# Patient Record
Sex: MALE | Race: WHITE | NOT HISPANIC OR LATINO | Employment: FULL TIME | URBAN - METROPOLITAN AREA
[De-identification: names, ages, dates, MRNs, and addresses within clinical notes are randomized per-mention and may not be internally consistent; named-entity substitution may affect disease eponyms.]

---
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
## 2019-06-04 NOTE — PROGRESS NOTES
"Patient seen in consultation for small subQ mass of the right face by Julio Azevedo    HPI:  Patient is a 17 year old male with lifelong history of subQ lump near his right eyebrow. Patient and mother state that it was more pronounced when he was a child. He says it had never been that painful and it has never been infected or drained. It does cause the skin near his eye to push into his field of vision slightly.    Review Of Systems    Skin: as above  Ears/Nose/Throat: negative  Respiratory: No shortness of breath, dyspnea on exertion, cough, or hemoptysis  Cardiovascular: negative  Gastrointestinal: negative  Genitourinary: negative  Musculoskeletal: negative  Neurologic: negative  Hematologic/Lymphatic/Immunologic: negative  Endocrine: negative      PMHx: denies    PSHx: none    Family History   Problem Relation Age of Onset     DIABETES No family hx of      Coronary Artery Disease No family hx of      Hypertension No family hx of      Hyperlipidemia No family hx of      CEREBROVASCULAR DISEASE No family hx of      Breast Cancer No family hx of      Colon Cancer No family hx of      Prostate Cancer No family hx of      Other Cancer No family hx of        Social History     Social History     Marital status: Single     Spouse name: N/A     Number of children: N/A     Years of education: N/A     Occupational History     Not on file.     Social History Main Topics     Smoking status: Never Smoker     Smokeless tobacco: Never Used     Alcohol use No     Drug use: No     Sexual activity: Not Currently     Other Topics Concern     Not on file     Social History Narrative       No current outpatient prescriptions on file.       Medications and history reviewed    Physical exam:  Vitals: BP 92/62  Temp 97.3  F (36.3  C) (Temporal)  Ht 1.778 m (5' 10\")  Wt 86.2 kg (190 lb)  BMI 27.26 kg/m2  BMI= Body mass index is 27.26 kg/(m^2).    Constitutional: Healthy, alert, non-distressed  Head: Normo-cephalic, atraumatic, " [FreeTextEntry1] : BP OK.]Probably has a heel spur. Pain in thumb probably arthritis or tensinitis, no lesions, r side superior and lateral to eyebrow with soft, mobile 1.3 cm subQ mass. no tenderness  Cardiovascular: RRR, no new murmurs, +S1, +S2 heart sounds, no clicks, rubs or gallops   Respiratory: CTAB, no rales, rhonchi or wheezing, equal chest rise, good respiratory effort   Gastrointestinal: Soft, non-tender, non distended, no rebound rigidity or guarding, no masses or hernias palpated   : Deferred  Musculoskeletal: Moves all extremities, normal  strength, no deformities noted   Skin: No suspicious lesions or rashes   Psychiatric: Mentation appears normal, affect appropriate   Hematologic/Lymphatic/Immunologic: Normal cervical and supraclavicular lymph nodes   Patient able to get up on table without difficulty.    Labs show:  No results found for this or any previous visit (from the past 24 hour(s)).      Assessment:     ICD-10-CM    1. Swelling, mass, or lump on face R22.0      Plan: This is a small lesion that is amenable to in office removal. The patient thinks he would be ok with the local anesthesia only. I described the procedure and potential complications. He is going to return later this week for removal.    Robbie Goyal, DO

## 2019-07-23 ENCOUNTER — OFFICE VISIT (OUTPATIENT)
Dept: FAMILY MEDICINE | Facility: CLINIC | Age: 19
End: 2019-07-23
Payer: COMMERCIAL

## 2019-07-23 VITALS
OXYGEN SATURATION: 98 % | BODY MASS INDEX: 27.38 KG/M2 | RESPIRATION RATE: 16 BRPM | HEART RATE: 116 BPM | TEMPERATURE: 97.6 F | SYSTOLIC BLOOD PRESSURE: 112 MMHG | DIASTOLIC BLOOD PRESSURE: 78 MMHG | WEIGHT: 190.8 LBS

## 2019-07-23 DIAGNOSIS — J02.9 PHARYNGITIS, UNSPECIFIED ETIOLOGY: Primary | ICD-10-CM

## 2019-07-23 LAB
DEPRECATED S PYO AG THROAT QL EIA: NORMAL
SPECIMEN SOURCE: NORMAL

## 2019-07-23 PROCEDURE — 99213 OFFICE O/P EST LOW 20 MIN: CPT | Performed by: NURSE PRACTITIONER

## 2019-07-23 PROCEDURE — 87081 CULTURE SCREEN ONLY: CPT | Performed by: NURSE PRACTITIONER

## 2019-07-23 PROCEDURE — 87880 STREP A ASSAY W/OPTIC: CPT | Performed by: NURSE PRACTITIONER

## 2019-07-23 ASSESSMENT — PAIN SCALES - GENERAL: PAINLEVEL: EXTREME PAIN (8)

## 2019-07-23 NOTE — PATIENT INSTRUCTIONS
Patient Education     Pharyngitis (Sore Throat), Report Pending    Pharyngitis (sore throat) is often due to a virus. It can also be caused by streptococcus (strep), bacteria. This is often called strep throat. Both viral and strep infections can cause throat pain that is worse when swallowing, aching all over, headache, and fever. Both types of infections are contagious. They may be spread by coughing, kissing, or touching others after touching your mouth or nose.  A test has been done to find out if you or your child have strep throat. Call this facility or your healthcare provider if you were not given your test results. If the test is positive for strep infection, you will need to take antibiotic medicines. A prescription can be called into your pharmacy at that time. If the test is negative, you probably have a viral pharyngitis. This does not need to be treated with antibiotics. Until you receive the results of the strep test, you should stay home from work. If your child is being tested, he or she should stay home from school.  Home care    Rest at home. Drink plenty of fluids so you won't get dehydrated.    If the test is positive for strep, you or your child should not go to work or school for the first 2 days of taking the antibiotics. After this time, you or your child will not be contagious. You or your child can then return to work or school when feeling better.     Use the antibiotic medicine for the full 10 days. Do not stop the medicine even if you or your child feel better. This is very important to make sure the infection is fully treated. It is also important to prevent medicine-resistant germs from growing. If you or your child were given an antibiotic shot, no more antibiotics are needed.    Use throat lozenges or numbing throat sprays to help reduce pain. Gargling with warm salt water will also help reduce throat pain. Dissolve 1/2 teaspoon of salt in 1 glass of warm water. Children can sip  on juice or a popsicle. Children 5 years and older can also suck on a lollipop or hard candy.    Don't eat salty or spicy foods or give them to your child. These can irritate the throat.  Other medicine for a child: You can give your child acetaminophen for fever, fussiness, or discomfort. In babies over 6 months of age, you may use ibuprofen instead of acetaminophen. If your child has chronic liver or kidney disease or ever had a stomach ulcer or GI bleeding, talk with your child s healthcare provider before giving these medicines. Aspirin should never be used by any child under 18 years of age who has a fever. It may cause severe liver damage.  Other medicine for an adult: You may use acetaminophen or ibuprofen to control pain or fever, unless another medicine was prescribed for this. If you have chronic liver or kidney disease or ever had a stomach ulcer or GI bleeding, talk with your healthcare provider before using these medicines.  Follow-up care  Follow up with your healthcare provider or our staff if you or your child don't get better over the next week.  When to seek medical advice  Call your healthcare provider right away if any of these occur:    Fever as directed by your healthcare provider. For children, seek care if:  ? Your child is of any age and has repeated fevers above 104 F (40 C).  ? Your child is younger than 2 years of age and has a fever of 100.4 F (38 C) for more than 1 day.  ? Your child is 2 years old or older and has a fever of 100.4 F (38 C) for more than 3 days.    New or worsening ear pain, sinus pain, or headache    Painful lumps in the back of neck    Stiff neck    Lymph nodes are getting larger     Can t swallow liquids, a lot of drooling, or can t open mouth wide due to throat pain    Signs of dehydration, such as very dark urine or no urine, sunken eyes, dizziness    Trouble breathing or noisy breathing    Muffled voice    New rash    Other symptoms getting worse  Prevention  Here  are steps you can take to help prevent an infection:    Keep good hand washing habits.    Don t have close contact with people who have sore throats, colds, or other upper respiratory infections.    Don t smoke, and stay away from secondhand smoke.    Stay up to date with of your vaccines.  Date Last Reviewed: 11/1/2017 2000-2018 [employer]. [address] [place], [place] [address]. All rights reserved. This information is not intended as a substitute for professional medical care. Always follow your healthcare professional's instructions.

## 2019-07-23 NOTE — LETTER
July 25, 2019      Cruzito Rodas  [id number] [address] [address]        Dear ,    We are writing to inform you of your test results.    Please let the patient know his throat culture is negative     Resulted Orders   Strep, Rapid Screen   Result Value Ref Range    Specimen Description Throat     Rapid Strep A Screen       NEGATIVE: No Group A streptococcal antigen detected by immunoassay, await culture report.   Beta strep group A culture   Result Value Ref Range    Specimen Description Throat     Culture Micro No beta hemolytic Streptococcus Group A isolated        If you have any questions or concerns, please call the clinic at the number listed above.       Sincerely,        SID Vela CNP

## 2019-07-23 NOTE — PROGRESS NOTES
Anna GOMEZ Case is a 18 year old male who presents to clinic today for the following health issues:    HPI   Acute Illness   Acute illness concerns: sore throat  Onset: 24 hours    Fever: no    Chills/Sweats: YES    Headache (location?): Yes    Sinus Pressure:YES    Conjunctivitis:  YES-     Ear Pain: no    Rhinorrhea: YES    Congestion: YES    Sore Throat: YES     Cough: no    Wheeze: no    Decreased Appetite: no    Nausea: no    Vomiting: no    Diarrhea:  no    Dysuria/Freq.: no    Fatigue/Achiness: YES    Sick/Strep Exposure: works at [employer] - he does not know if he could have been exposed     Therapies Tried and outcome: none        BP Readings from Last 3 Encounters:   07/23/19 112/78   05/11/18 120/82   04/17/18 119/72    Wt Readings from Last 3 Encounters:   07/23/19 86.5 kg (190 lb 12.8 oz) (88 %)*   05/11/18 86.2 kg (190 lb) (90 %)*   04/11/18 86.2 kg (190 lb) (90 %)*     * Growth percentiles are based on CDC (Boys, 2-20 Years) data.                      Reviewed and updated as needed this visit by Provider         Review of Systems   ROS COMP: Constitutional, HEENT, cardiovascular, pulmonary, gi and gu systems are negative, except as otherwise noted.      Objective    /78 (BP Location: Right arm, Patient Position: Sitting, Cuff Size: Adult Regular)   Pulse 116   Temp 97.6  F (36.4  C) (Temporal)   Resp 16   Wt 86.5 kg (190 lb 12.8 oz)   SpO2 98%   BMI 27.38 kg/m    Body mass index is 27.38 kg/m .  Physical Exam   GENERAL: healthy, alert and no distress  EYES: Eyes grossly normal to inspection, PERRL and conjunctivae and sclerae normal  HENT: Ear canals are clear, TMs pearly gray.  No maxillary sinus tenderness to palpation.  Posterior oropharynx is erythematous without exudate or tonsillar hypertrophy  NECK: no adenopathy, no asymmetry, masses, or scars and thyroid normal to palpation  RESP: lungs clear to auscultation - no rales, rhonchi or wheezes  CV: regular rate and  rhythm, normal S1 S2, no S3 or S4, no murmur, click or rub, no peripheral edema and peripheral pulses strong  ABDOMEN: soft, nontender, no hepatosplenomegaly, no masses and bowel sounds normal      Diagnostic Test Results:  Labs reviewed in Epic  Results for orders placed or performed in visit on 07/23/19 (from the past 24 hour(s))   Strep, Rapid Screen   Result Value Ref Range    Specimen Description Throat     Rapid Strep A Screen       NEGATIVE: No Group A streptococcal antigen detected by immunoassay, await culture report.           Assessment & Plan       ICD-10-CM    1. Pharyngitis, unspecified etiology J02.9 Strep, Rapid Screen     Beta strep group A culture        Symptomatic and supportive measures including saline gargles, Tylenol or ibuprofen as needed.  Increase oral fluids.  Diet as tolerated.  Throat culture will be submitted and patient will be notified of result        SID Vela [facility]

## 2019-07-25 LAB
BACTERIA SPEC CULT: NORMAL
SPECIMEN SOURCE: NORMAL

## 2020-11-06 ENCOUNTER — VIRTUAL VISIT (OUTPATIENT)
Dept: FAMILY MEDICINE | Facility: OTHER | Age: 20
End: 2020-11-06

## 2020-11-06 NOTE — PROGRESS NOTES
"Date: 2020 11:53:23  Clinician: Lise Goldstein  Clinician NPI: [id number]  Patient: Cruzito Case  Patient : 2000  Patient Address: [address] [place] [address]  Patient Phone: [phone]  Visit Protocol: URI  Patient Summary:  Cruzito is a 20 year old ( : 2000 ) male who initiated a [facility] Visit for COVID-19 (Coronavirus) evaluation and screening. When asked the question \"Please sign me up to receive news, health information and promotions. \", Cruzito responded \"No\".    When asked when his symptoms started, Cruzito reported that he does not have any symptoms.   He denies taking antibiotic medication in the past month and having recent facial or sinus surgery in the past 60 days.    Pertinent COVID-19 (Coronavirus) information  Cruzito does not work or volunteer as healthcare worker or a . In the past 14 days, Cruzito has not worked or volunteered at a healthcare facility or group living setting.   In the past 14 days, he also has not lived in a congregate living setting.   Cruzito has not had a close contact with a laboratory-confirmed COVID-19 patient within the last 14 days.    Since 2019, Cruzito has not been tested for COVID-19 and has not had upper respiratory infection or influenza-like illness.   Pertinent medical history  Cruzito needs a return to work/school note.   Weight: 190 lbs   Cruzito does not smoke or use smokeless tobacco.   Weight: 190 lbs  A synchronous phone visit was initiated by the provider for the following reason: not sure how to help    MEDICATIONS: No current medications, ALLERGIES: NKDA  Clinician Response:  Dear Cruzito,   Based on your exposure to COVID-19 (coronavirus), we would like to test you for this virus.  1. Please call [phone] to schedule your visit. Explain that you were referred by [facility] to have a COVID-19 test. Be ready to share your [facility] visit ID number.  * If you need to schedule in " [place] please call [phone] or for [employer] employees please call [phone].   * If you need to schedule in the [place] area please call [phone]. [place] employees call [phone].   The following will serve as your written order for this COVID Test, ordered by me, for the indication of suspected COVID [Z20.828]: The test will be ordered in [employer], our electronic health record, after you are scheduled. It will show as ordered and authorized by Tim Thakkar MD.  Order: COVID-19 (coronavirus) PCR for ASYMPTOMATIC EXPOSURE [facility].   If you know you have had close contact with someone who tested positive, you should be quarantined for 14 days after this exposure. You should stay in quarantine for the14 days even if the covid test is negative, the optimal time to test after exposure is 5-7 days from the exposure  Quarantine means   What should I do?  For safety, it's very important to follow these rules. Do this for 14 days after the date you were last exposed to the virus..  Stay home and away from others. Don't go to school or anywhere else. Generally quarantine means staying home from work but there are some exceptions to this. Please contact your workplace.   No hugging, kissing or shaking hands.  Don't let anyone visit.  Cover your mouth and nose with a mask, tissue or washcloth to avoid spreading germs.  Wash your hands and face often. Use soap and water.  What are the symptoms of COVID-19?  The most common symptoms are cough, fever and trouble breathing. Less common symptoms include headache, body aches, fatigue (feeling very tired), chills, sore throat, stuffy or runny nose, diarrhea (loose poop), loss of taste or smell, belly pain, and nausea or vomiting (feeling sick to your stomach or throwing up).  After 14 days, if you have still don't have symptoms, you likely don't have this virus.  If you develop symptoms, follow these guidelines.  If you're normally healthy: Please start  another [facility] visit to report your symptoms. Go to [email or web address].  If you have a serious health problem (like cancer, heart failure, an organ transplant or kidney disease): Call your specialty clinic. Let them know that you might have COVID-19.  2. When it's time for your COVID test:  Stay at least 6 feet away from others. (If someone will drive you to your test, stay in the backseat, as far away from the  as you can.)  Cover your mouth and nose with a mask, tissue or washcloth.  Go straight to the testing site. Don't make any stops on the way there or back.  Please note  Caregivers in these groups are at risk for severe illness due to COVID-19:  o People 65 years and older  o People who live in a nursing home or long-term care facility  o People with chronic disease (lung, heart, cancer, diabetes, kidney, liver, immunologic)  o People who have a weakened immune system, including those who:  Are in cancer treatment  Take medicine that weakens the immune system, such as corticosteroids  Had a bone marrow or organ transplant  Have an immune deficiency  Have poorly controlled HIV or AIDS  Are obese (body mass index of 40 or higher)  Smoke regularly  Where can I get more information?  [facility] -- About COVID-19: [email or web address]  CDC -- What to Do If You're Sick: www.cdc.gov/coronavirus/2019-ncov/about/steps-when-sick.html  CDC -- Ending Home Isolation: www.cdc.gov/coronavirus/2019-ncov/hcp/disposition-in-home-patients.html  CDC -- Caring for Someone: www.cdc.gov/coronavirus/2019-ncov/if-you-are-sick/care-for-someone.html  [facility] -- Interim Guidance for Hospital Discharge to Home: www.health.[facility].mn.us/diseases/coronavirus/hcp/hospdischarge.pdf  [facility] clinical trials (COVID-19 research studies): clinicalaffairs.[facility]/umn-clinical-trials  Below are the COVID-19 hotlines at the [place] Department of Health ([facility]). Interpreters are available.  For health questions: Call  648.698.4225 or 1-595.112.7761 (7 a.m. to 7 p.m.)  For questions about schools and childcare: Call 166-870-5897 or 1-695.254.8253 (7 a.m. to 7 p.m.)    Diagnosis: Contact with and (suspected) exposure to other viral communicable diseases  Diagnosis ICD: Z20.828  Triage Notes: I reviewed the patient's history, verified their identity, and explained the [facility] Visit process.    mom is positive for covid 19   member of the same household close contact  Synchronous Triage: phone, status: completed, duration: 301 seconds

## 2020-11-09 DIAGNOSIS — Z20.822 EXPOSURE TO COVID-19 VIRUS: Primary | ICD-10-CM

## 2020-11-09 PROCEDURE — U0003 INFECTIOUS AGENT DETECTION BY NUCLEIC ACID (DNA OR RNA); SEVERE ACUTE RESPIRATORY SYNDROME CORONAVIRUS 2 (SARS-COV-2) (CORONAVIRUS DISEASE [COVID-19]), AMPLIFIED PROBE TECHNIQUE, MAKING USE OF HIGH THROUGHPUT TECHNOLOGIES AS DESCRIBED BY CMS-2020-01-R: HCPCS | Performed by: FAMILY MEDICINE

## 2020-11-10 LAB
SARS-COV-2 RNA SPEC QL NAA+PROBE: NOT DETECTED
SPECIMEN SOURCE: NORMAL

## 2020-11-24 ENCOUNTER — OFFICE VISIT (OUTPATIENT)
Dept: FAMILY MEDICINE | Facility: CLINIC | Age: 20
End: 2020-11-24
Payer: COMMERCIAL

## 2020-11-24 VITALS
DIASTOLIC BLOOD PRESSURE: 74 MMHG | HEART RATE: 72 BPM | SYSTOLIC BLOOD PRESSURE: 118 MMHG | BODY MASS INDEX: 28.97 KG/M2 | TEMPERATURE: 97.8 F | RESPIRATION RATE: 16 BRPM | WEIGHT: 195.6 LBS | OXYGEN SATURATION: 96 % | HEIGHT: 69 IN

## 2020-11-24 DIAGNOSIS — D17.30 LIPOMA OF SKIN AND SUBCUTANEOUS TISSUE: Primary | ICD-10-CM

## 2020-11-24 PROCEDURE — 99213 OFFICE O/P EST LOW 20 MIN: CPT | Performed by: PHYSICIAN ASSISTANT

## 2020-11-24 ASSESSMENT — PAIN SCALES - GENERAL: PAINLEVEL: NO PAIN (0)

## 2020-11-24 ASSESSMENT — MIFFLIN-ST. JEOR: SCORE: 1886.58

## 2020-11-24 ASSESSMENT — PATIENT HEALTH QUESTIONNAIRE - PHQ9: SUM OF ALL RESPONSES TO PHQ QUESTIONS 1-9: 3

## 2020-11-24 NOTE — PATIENT INSTRUCTIONS
Patient Education     Understanding a Lipoma     A lipoma is a lump under the skin that s made of fat. It s not cancer (benign). It feels soft like rubber when you press it, and in most cases it doesn t hurt. Some people have more than one. A lipoma grows slowly over time and doesn t cause many problems. Lipomas occur most often in adults from ages 40 to 60. They are more common in men.   How to say it  [id number]  What causes a lipoma?  Experts don't know yet what causes lipomas. They are still learning more. They may be partly caused by a problem in a gene. They can run in families. Familial multiple lipomatosis is when 2 or more family members have many lipomas.  Symptoms of a lipoma  The main symptom of a lipoma is a soft lump under the skin that doesn t hurt unless it is pressing on a nerve. It may be small, around 1/4 inch across. Or it may be larger, up to 4 inches across or more.  There are different kinds of lipomas. The most common kind occurs under the skin of the shoulders, chest, back, belly, or under the arms. In some cases, a lipoma can occur on the legs. In rare cases, one may occur deeper in the body or in a muscle.  Treatment for a lipoma  In most cases, a lipoma doesn t need treatment. Your healthcare provider may look at it during regular checkups to see if it changes.  But if the lipoma is painful or you want it removed for cosmetic reasons, it can be removed with surgery. The surgery is called excision. The lipoma will most likely not grow back after surgery. During surgery, the area around the lipoma is numbed. If you have a deep lipoma, you may need medicine to numb a larger area (regional anesthesia). Or you may need medicine to put you to sleep during the procedure (general anesthesia). Then the doctor makes a cut over the area of the lipoma. He or she removes the lump of fat. The cut is then closed with stitches.  Possible complications of a lipoma  A large lipoma inside the body can  press on organs, nerves, or other tissues and cause problems. For example, it can cause problems with breathing or digestion.  Living with a lipoma  Your healthcare provider may look at the lipoma during regular checkups to see if it changes or is causing problems.  When to call your healthcare provider  Call your healthcare provider right away if you have any of these:    Lipoma that grows quickly, causes pain, or feels hard    New lipomas  [employer] last reviewed this educational content on 11/1/2018 2000-2020 [employer]. [address] [place] [address]. All rights reserved. This information is not intended as a substitute for professional medical care. Always follow your healthcare professional's instructions.           Patient Education     Lipoma (Removed)   A lipoma is a growth made of fatty tissue. It is not cancer (benign). It looks like a soft lump, often less than 2 inches across. A lipoma may be removed (excised) because you don t like how it looks. Or it may be removed if it is painful or growing. A lipoma is made of fat cells. But it is not linked to diet. And it does not mean that you are overweight.  Home care  The following guidelines will help you care for your wound:    Keep the wound clean and dry. If a bandage was applied and it gets wet or dirty, replace it. Otherwise, leave it in place for the first 24 hours. Then change it once a day or as directed.    If stitches (sutures) were used, clean the wound daily:  ? After removing the bandage, wash the area with soap and water. Use a cotton swab to loosen and remove any blood or crust that forms.  ? After cleaning, apply a thin layer of petroleum ointment. Or your healthcare provider may advise an antibiotic ointment. This will keep the wound clean and make it easier to remove the stitches. Reapply the bandage.  ? You may shower as normal after the first 24 hours. But don t soak the area in water (no baths or swimming)  until the stitches are removed.    If surgical tape closures were used, keep the area clean and dry. If the area gets wet, pat it dry with a towel. After the surgical tape closures have been removed, it's safe to go back to your normal activities.    You may use over-the-counter pain medicine to control pain, unless another medicine was given. Talk with your provider before using these medicines if you have long-term (chronic) liver or kidney disease, or ever had a stomach ulcer or GI (gastrointestinal) bleeding.  Follow-up care  Most skin wounds heal in 10 days. But an infection may sometimes occur, even with correct treatment. Check the wound daily for the signs of infection listed below. Stitches should be taken out in 7 to 14 days. You may have surgical tape closures. If these have not fallen off after 7 days, you can remove them yourself unless you were told otherwise.  When to get medical advice  Call your healthcare provider right away if any of these occur:    Pain in the wound gets worse    Redness, swelling, or pus coming from the wound    Fever of 100.4 F (38 C) or higher, or as directed by your provider    Stitches come apart or fall out, or surgical tape falls off before 5 days    The wound edges reopen    Numb feeling that doesn t go away by the time stitches are removed  [employer] last reviewed this educational content on 8/1/2019 2000-2020 [employer]. [address] [place] [address], [place] [address]. All rights reserved. This information is not intended as a substitute for professional medical care. Always follow your healthcare professional's instructions.

## 2020-11-24 NOTE — NURSING NOTE
Health Maintenance Due   Topic Date Due     HPV IMMUNIZATION (1 - Male 2-dose series) 07/29/2010     HIV SCREENING  07/29/2014     HEPATITIS C SCREENING  07/29/2017     PHQ-9  10/04/2018     PREVENTIVE CARE VISIT  04/04/2019     INFLUENZA VACCINE (1) 09/01/2020     Sindi GOMEZ LPN

## 2020-11-24 NOTE — PROGRESS NOTES
"Anna [place] D Case is a 20 year old male who presents to clinic today for the following health issues:    HPI         Concern - lump on his forehead  Onset: removed  2 years ago and came back  Description: lump  Intensity: mild  Progression of Symptoms:  same  Accompanying Signs & Symptoms: one  Previous history of similar problem: YES  Precipitating factors:        Worsened by: nothing  Alleviating factors:        Improved by: nothing  Therapies tried and outcome:  none     Patient is a 20 year old male who presents today to discuss recurrent lipoma. He says that he has had a lump along his right eyebrow since early childhood. In 2018 it was removed by general surgery and sent to pathology. 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 Patient said that it slowly began to regrow over the year following the extraction. He estimates that it has now been present for at least 1 year. He is interested in returning to general surgery to have it removed once again. We did discuss plastic surgeons vs general surgery. He is comfortable returning to the same surgeon who originally had it removed.     Review of Systems   Constitutional, HEENT, cardiovascular, pulmonary, gi and gu systems are negative, except as otherwise noted.      Objective    /74 (BP Location: Right arm, Patient Position: Chair, Cuff Size: Adult Regular)   Pulse 72   Temp 97.8  F (36.6  C) (Temporal)   Resp 16   Ht 1.759 m (5' 9.25\")   Wt 88.7 kg (195 lb 9.6 oz)   SpO2 96%   BMI 28.68 kg/m    Body mass index is 28.68 kg/m .  Physical Exam   GENERAL: healthy, alert and no distress  RESP: lungs clear to auscultation - no rales, rhonchi or wheezes  CV: regular rate and rhythm, normal S1 S2, no S3 or S4, no murmur, click or rub, no peripheral edema and peripheral pulses strong  MS: no gross musculoskeletal defects noted, no edema  SKIN: [id number], rubbery, mobile mass superolateral to the right eyebrow  PSYCH: mentation appears normal, affect " normal/bright       Assessment & Plan     Lipoma of skin and subcutaneous tissue  Referral placed, patient will call to schedule. If he changes his mind and wishes to be seen by plastic surgeon he will call. Educational information sent home with the patient.   - GENERAL SURG ADULT REFERRAL; Future      Return in about 6 months (around 5/24/2021) for Return for scheduled annual checkup with PCP.    JOSE ALBERTO Rueda [facility]

## 2020-12-04 ENCOUNTER — OFFICE VISIT (OUTPATIENT)
Dept: SURGERY | Facility: CLINIC | Age: 20
End: 2020-12-04
Attending: PHYSICIAN ASSISTANT
Payer: COMMERCIAL

## 2020-12-04 VITALS
HEIGHT: 69 IN | DIASTOLIC BLOOD PRESSURE: 60 MMHG | SYSTOLIC BLOOD PRESSURE: 92 MMHG | TEMPERATURE: 98.8 F | BODY MASS INDEX: 28.58 KG/M2 | WEIGHT: 193 LBS

## 2020-12-04 DIAGNOSIS — D17.30 LIPOMA OF SKIN AND SUBCUTANEOUS TISSUE: ICD-10-CM

## 2020-12-04 PROCEDURE — 99213 OFFICE O/P EST LOW 20 MIN: CPT | Performed by: SURGERY

## 2020-12-04 ASSESSMENT — MIFFLIN-ST. JEOR: SCORE: 1874.78

## 2020-12-04 NOTE — LETTER
"    12/4/2020         RE: Cruzito GOMEZ Case  [id number] [facility] [address]        Dear Colleague,    Thank you for referring your patient, Cruzito GOMEZ Case, to the [facility]. Please see a copy of my visit note below.    Patient seen in consultation for right eyebrow subQ lump    HPI:  Patient is a 20 year old male who I saw in 2018 for same. We did in office excision at that time and specimen was sent for pathology. 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 It seems to have recurred in the same location. He is unsure whether it was \"flat\" for some period of time or when it started to bump again. He is interested in having it removed again.    Review Of Systems    Skin: as above  Ears/Nose/Throat: negative  Respiratory: No shortness of breath, dyspnea on exertion, cough, or hemoptysis  Cardiovascular: negative  Gastrointestinal: negative  Genitourinary: negative  Musculoskeletal: negative  Neurologic: negative  Hematologic/Lymphatic/Immunologic: negative  Endocrine: negative      No past medical history on file.    No past surgical history on file.    Family History   Problem Relation Age of Onset     Diabetes No family hx of      Coronary Artery Disease No family hx of      Hypertension No family hx of      Hyperlipidemia No family hx of      Cerebrovascular Disease No family hx of      Breast Cancer No family hx of      Colon Cancer No family hx of      Prostate Cancer No family hx of      Other Cancer No family hx of        Social History     Socioeconomic History     Marital status: Single     Spouse name: Not on file     Number of children: Not on file     Years of education: Not on file     Highest education level: Not on file   Occupational History     Not on file   Social Needs     Financial resource strain: Not on file     Food insecurity     Worry: Not on file     Inability: Not on file     Transportation needs     Medical: Not on file     Non-medical: Not on file   Tobacco Use     " "Smoking status: Never Smoker     Smokeless tobacco: Never Used   Substance and Sexual Activity     Alcohol use: No     Drug use: No     Sexual activity: Not Currently   Lifestyle     Physical activity     Days per week: Not on file     Minutes per session: Not on file     Stress: Not on file   Relationships     Social connections     Talks on phone: Not on file     Gets together: Not on file     Attends [employer] service: Not on file     Active member of club or organization: Not on file     Attends meetings of clubs or organizations: Not on file     Relationship status: Not on file     Intimate partner violence     Fear of current or ex partner: Not on file     Emotionally abused: Not on file     Physically abused: Not on file     Forced sexual activity: Not on file   Other Topics Concern     Not on file   Social History Narrative     Not on file       No current outpatient medications on file.       Medications and history reviewed    Physical exam:  Vitals: BP 92/60   Temp 98.8  F (37.1  C) (Temporal)   Ht 1.759 m (5' 9.25\")   Wt 87.5 kg (193 lb)   BMI 28.30 kg/m    BMI= Body mass index is 28.3 kg/m .    Constitutional: Healthy, alert, non-distressed   Head: Normo-cephalic, atraumatic, no lesions, right eyebrow soft tissue lump, mobile, soft, ~1.5 cm just deep to previous well healed incision  Cardiovascular: RRR, no new murmurs, +S1, +S2 heart sounds, no clicks, rubs or gallops   Respiratory: CTAB, no rales, rhonchi or wheezing, equal chest rise, good respiratory effort   Gastrointestinal: Soft, non-tender, non distended, no rebound rigidity or guarding, no masses or hernias palpated   : Deferred  Musculoskeletal: Moves all extremities, normal  strength, no deformities noted   Skin: No suspicious lesions or rashes   Psychiatric: Mentation appears normal, affect appropriate   Hematologic/Lymphatic/Immunologic: Normal cervical and supraclavicular lymph nodes   Patient able to get up on table without " difficulty.    Labs show:  No results found for this or any previous visit (from the past 24 hour(s)).    Imaging shows:  none    Assessment:     ICD-10-CM    1. 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 He is going to return in 7 days for the procedure as we do not have time this morning.    Robbie Goyal, DO        Again, thank you for allowing me to participate in the care of your patient.        Sincerely,        Robbie Goyal DO

## 2020-12-04 NOTE — PROGRESS NOTES
"Patient seen in consultation for right eyebrow subQ lump    HPI:  Patient is a 20 year old male who I saw in 2018 for same. We did in office excision at that time and specimen was sent for pathology. 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 It seems to have recurred in the same location. He is unsure whether it was \"flat\" for some period of time or when it started to bump again. He is interested in having it removed again.    Review Of Systems    Skin: as above  Ears/Nose/Throat: negative  Respiratory: No shortness of breath, dyspnea on exertion, cough, or hemoptysis  Cardiovascular: negative  Gastrointestinal: negative  Genitourinary: negative  Musculoskeletal: negative  Neurologic: negative  Hematologic/Lymphatic/Immunologic: negative  Endocrine: negative      No past medical history on file.    No past surgical history on file.    Family History   Problem Relation Age of Onset     Diabetes No family hx of      Coronary Artery Disease No family hx of      Hypertension No family hx of      Hyperlipidemia No family hx of      Cerebrovascular Disease No family hx of      Breast Cancer No family hx of      Colon Cancer No family hx of      Prostate Cancer No family hx of      Other Cancer No family hx of        Social History     Socioeconomic History     Marital status: Single     Spouse name: Not on file     Number of children: Not on file     Years of education: Not on file     Highest education level: Not on file   Occupational History     Not on file   Social Needs     Financial resource strain: Not on file     Food insecurity     Worry: Not on file     Inability: Not on file     Transportation needs     Medical: Not on file     Non-medical: Not on file   Tobacco Use     Smoking status: Never Smoker     Smokeless tobacco: Never Used   Substance and Sexual Activity     Alcohol use: No     Drug use: No     Sexual activity: Not Currently   Lifestyle     Physical activity     Days per week: Not on file     Minutes per session: " "Not on file     Stress: Not on file   Relationships     Social connections     Talks on phone: Not on file     Gets together: Not on file     Attends Church service: Not on file     Active member of club or organization: Not on file     Attends meetings of clubs or organizations: Not on file     Relationship status: Not on file     Intimate partner violence     Fear of current or ex partner: Not on file     Emotionally abused: Not on file     Physically abused: Not on file     Forced sexual activity: Not on file   Other Topics Concern     Not on file   Social History Narrative     Not on file       No current outpatient medications on file.       Medications and history reviewed    Physical exam:  Vitals: BP 92/60   Temp 98.8  F (37.1  C) (Temporal)   Ht 1.759 m (5' 9.25\")   Wt 87.5 kg (193 lb)   BMI 28.30 kg/m    BMI= Body mass index is 28.3 kg/m .    Constitutional: Healthy, alert, non-distressed   Head: Normo-cephalic, atraumatic, no lesions, right eyebrow soft tissue lump, mobile, soft, ~1.5 cm just deep to previous well healed incision  Cardiovascular: RRR, no new murmurs, +S1, +S2 heart sounds, no clicks, rubs or gallops   Respiratory: CTAB, no rales, rhonchi or wheezing, equal chest rise, good respiratory effort   Gastrointestinal: Soft, non-tender, non distended, no rebound rigidity or guarding, no masses or hernias palpated   : Deferred  Musculoskeletal: Moves all extremities, normal  strength, no deformities noted   Skin: No suspicious lesions or rashes   Psychiatric: Mentation appears normal, affect appropriate   Hematologic/Lymphatic/Immunologic: Normal cervical and supraclavicular lymph nodes   Patient able to get up on table without difficulty.    Labs show:  No results found for this or any previous visit (from the past 24 hour(s)).    Imaging shows:  none    Assessment:     ICD-10-CM    1. Lipoma of skin and subcutaneous tissue  D17.30 GENERAL SURG ADULT REFERRAL     Plan: Re-excision of " recurrent fibrolipoma of the right eyebrow in the office. He is going to return in 7 days for the procedure as we do not have time this morning.    Robbie Goyal, DO

## 2020-12-11 ENCOUNTER — OFFICE VISIT (OUTPATIENT)
Dept: SURGERY | Facility: CLINIC | Age: 20
End: 2020-12-11
Payer: COMMERCIAL

## 2020-12-11 DIAGNOSIS — R22.0 SUBCUTANEOUS MASS OF HEAD: Primary | ICD-10-CM

## 2020-12-11 PROCEDURE — 88304 TISSUE EXAM BY PATHOLOGIST: CPT | Mod: GC | Performed by: PATHOLOGY

## 2020-12-11 PROCEDURE — 11443 EXC FACE-MM B9+MARG 2.1-3 CM: CPT | Mod: 51 | Performed by: SURGERY

## 2020-12-11 PROCEDURE — 12052 INTMD RPR FACE/MM 2.6-5.0 CM: CPT | Performed by: SURGERY

## 2020-12-11 NOTE — LETTER
[address] [address]  Phone: [phone]    December 11, 2020        Cruzito GOMEZ Case  [id number] [facility] [address]          To whom it may concern:    RE: Cruzito GOMEZ Case    Patient was seen today for a procedure at our clinic and missed work. Please excuse the patient from work 12/11/20 & 12/12/20.           Please contact me for questions or concerns.      Sincerely,        Robbie Goyal, DO

## 2020-12-11 NOTE — PROGRESS NOTES
PROCEDURE: excision right forehead subcutaneous mass   Written consent was obtained    The right forehead area was prepped and appropriately anesthetized with 1% lidocaine with epinephrine. Using the usual technique, excision of recurrent fibrolipoma with underlying cyst was performed.  The cyst measured approximately 2.8 x 2.5 x 1.0 cm. Closure was accomplished with interrupted 3-0 vicryl for the dermal layer and running subcuticular 4-0 monocryl for the skin. Total length of the incision after closure was 3.2 cm. An appropriate  dressing was applied.  The procedure was well tolerated and without complications. Specimen was sent to Pathology.

## 2020-12-11 NOTE — LETTER
Cruzito GOMEZ Case  [address] [address] [place] [id number]    January 13, 2021      Dear Cruzito,  This letter is to inform you of the results of your pathology report from your excision.  Your pathology report was:  FINAL DIAGNOSIS:   Skin, right forehead, excision:   - Features consistent with partially ruptured cyst    COMMENT:   Given the presence of pilosebaceous structures and granulomatous   inflammation, these features are consistent   with a ruptured dermoid cyst.  This is consistent with the visual findings during the procedure. Hope you are healing well.  If you have further questions please don t hesitate to call our clinic at [phone].   Sincerely,     Robbie Goyal, DO

## 2020-12-11 NOTE — LETTER
12/11/2020         RE: Cruzito Rodas  [address] [address] [address]        Dear Colleague,    Thank you for referring your patient, Cruzito Rodas, to the [facility]. Please see a copy of my visit note below.    PROCEDURE: excision right forehead subcutaneous mass   Written consent was obtained    The right forehead area was prepped and appropriately anesthetized with 1% lidocaine with epinephrine. Using the usual technique, excision of recurrent fibrolipoma with underlying cyst was performed.  Closure was accomplished with interrupted 3-0 vicryl for the dermal layer and running subcuticular 4-0 monocryl for the skin. Total length of the incision after closure was 3.2 cm. An appropriate  dressing was applied.  The procedure was well tolerated and without complications. Specimen was sent to Pathology.          Again, thank you for allowing me to participate in the care of your patient.        Sincerely,        Robbie Goyal, DO

## 2020-12-18 LAB — COPATH REPORT: NORMAL

## 2021-01-09 ENCOUNTER — HEALTH MAINTENANCE LETTER (OUTPATIENT)
Age: 21
End: 2021-01-09

## 2021-05-20 ENCOUNTER — NURSE TRIAGE (OUTPATIENT)
Dept: FAMILY MEDICINE | Facility: CLINIC | Age: 21
End: 2021-05-20

## 2021-05-20 NOTE — TELEPHONE ENCOUNTER
Patient states his throat looks like it has in the past when he has had strep throat.  He is offered an appointment today in [place], but he prefers to be seen tomorrow in [place].  He is scheduled with Arabella Drummond.  He is given home care measures for sure throat.    Reason for Disposition    Patient requesting a strep throat test    Patient wants to be seen    Sore throat    Additional Information    Negative: Severe difficulty breathing (e.g., struggling for each breath, speaks in single words)    Negative: Sounds like a life-threatening emergency to the triager    Negative: Throat culture results, call about    Negative: Productive cough is the main symptom    Negative: Runny nose is the main symptom    Negative: Drooling or spitting out saliva (because can't swallow)    Negative: Unable to open mouth completely    Negative: Drinking very little and has signs of dehydration (e.g., no urine > 12 hours, very dry mouth, very lightheaded)    Negative: Patient sounds very sick or weak to the triager    Negative: Difficulty breathing (per caller) but not severe    Negative: Fever > 103 F (39.4 C)    Negative: Refuses to drink anything for > 12 hours    Negative: Fever present > 3 days (72 hours)    Negative: Pus on tonsils (back of throat) and swollen neck lymph nodes ('glands')    Negative: Earache also present    Negative: Widespread rash (especially chest and abdomen)    Negative: Diabetes mellitus or weak immune system (e.g., HIV positive, cancer chemo, splenectomy, organ transplant, chronic steroids)    Negative: History of rheumatic fever    Protocols used: SORE THROAT-A-OH

## 2021-05-20 NOTE — PROGRESS NOTES
Assessment & Plan     Cough  - Symptomatic COVID-19 Virus (Coronavirus) by PCR    Acute pharyngitis, unspecified etiology  - Streptococcus A Rapid Scr w Reflx to PCR    Covid and strep testing pending.  Isolation quarantine guidelines reviewed.  Symptoms are most likely viral with an allergic component.  Discussed symptomatic measures including fluids, rest, honey, salt water gargles, antihistamine.  Follow-up with PCP if symptoms worsen or fail to improve.    30 minutes spent on the date of the encounter doing chart review, patient visit and documentation       Return in about 1 week (around 5/28/2021) for Recheck with primary care provider if not improved.    [facility]SE ALBERTO [facility] is a 20 year old who presents for the following health issues     HPI     Acute Illness  Acute illness concerns: sore throat  Onset/Duration: 3 days  Symptoms:  Fever: no  Chills/Sweats: no  Headache (location?): YES  Sinus Pressure: YES  Conjunctivitis:  no  Ear Pain: no  Rhinorrhea: YES  Congestion: YES  Sore Throat: YES- 4/10  Cough: YES-productive of yellow sputum, productive of green sputum, waxing and waning over time  Wheeze: no  Decreased Appetite: no  Nausea: no  Vomiting: no  Diarrhea: no  Fatigue/Achiness: YES- tired yesterday  Sick/Strep Exposure: no  Therapies tried and outcome: cough drops, gargle salt water, warm shower, popsicles    Jean Claude presents to the clinic today for evaluation of a cough, sore throat and nasal congestion for the last 3 days.  He has had a headache, nasal congestion with rhinorrhea and postnasal drainage, sore throat, cough that feels like it is coming from a tickle in his throat and is often productive as well as increased fatigue.  He has not had a fever, shortness of breath or wheezing.  He states that last night symptoms began to improve and he still feels like symptoms are improving this morning.    Review of Systems   ROS negative  except as stated above.        Objective    /62   Pulse 72   Temp 98.1  F (36.7  C) (Temporal)   SpO2 98%   There is no height or weight on file to calculate BMI.  Physical Exam   GENERAL: healthy, alert and no distress  EYES: Eyes grossly normal to inspection, PERRL and conjunctivae and sclerae normal  HENT: ear canals and TM's normal, nose and mouth without ulcers or lesions  NECK: no adenopathy, no asymmetry, masses, or scars and thyroid normal to palpation  RESP: lungs clear to auscultation - no rales, rhonchi or wheezes  CV: regular rate and rhythm, normal S1 S2, no S3 or S4, no murmur, click or rub, no peripheral edema and peripheral pulses strong  SKIN: no suspicious lesions or rashes  NEURO: Normal strength and tone, mentation intact and speech normal    No results found for any visits on 05/21/21.

## 2021-05-21 ENCOUNTER — OFFICE VISIT (OUTPATIENT)
Dept: FAMILY MEDICINE | Facility: CLINIC | Age: 21
End: 2021-05-21
Payer: COMMERCIAL

## 2021-05-21 ENCOUNTER — TELEPHONE (OUTPATIENT)
Dept: FAMILY MEDICINE | Facility: CLINIC | Age: 21
End: 2021-05-21

## 2021-05-21 VITALS
HEART RATE: 72 BPM | DIASTOLIC BLOOD PRESSURE: 62 MMHG | TEMPERATURE: 98.1 F | OXYGEN SATURATION: 98 % | SYSTOLIC BLOOD PRESSURE: 107 MMHG

## 2021-05-21 DIAGNOSIS — R05.9 COUGH: Primary | ICD-10-CM

## 2021-05-21 DIAGNOSIS — J02.9 ACUTE PHARYNGITIS, UNSPECIFIED ETIOLOGY: ICD-10-CM

## 2021-05-21 LAB
DEPRECATED S PYO AG THROAT QL EIA: NEGATIVE
LABORATORY COMMENT REPORT: NORMAL
SARS-COV-2 RNA RESP QL NAA+PROBE: NEGATIVE
SARS-COV-2 RNA RESP QL NAA+PROBE: NORMAL
SPECIMEN SOURCE: NORMAL
STREP GROUP A PCR: NOT DETECTED

## 2021-05-21 PROCEDURE — U0003 INFECTIOUS AGENT DETECTION BY NUCLEIC ACID (DNA OR RNA); SEVERE ACUTE RESPIRATORY SYNDROME CORONAVIRUS 2 (SARS-COV-2) (CORONAVIRUS DISEASE [COVID-19]), AMPLIFIED PROBE TECHNIQUE, MAKING USE OF HIGH THROUGHPUT TECHNOLOGIES AS DESCRIBED BY CMS-2020-01-R: HCPCS | Performed by: PHYSICIAN ASSISTANT

## 2021-05-21 PROCEDURE — U0005 INFEC AGEN DETEC AMPLI PROBE: HCPCS | Performed by: PHYSICIAN ASSISTANT

## 2021-05-21 PROCEDURE — 87651 STREP A DNA AMP PROBE: CPT | Performed by: PHYSICIAN ASSISTANT

## 2021-05-21 PROCEDURE — 99N1174 PR STATISTIC STREP A RAPID: Performed by: PHYSICIAN ASSISTANT

## 2021-05-21 PROCEDURE — 99214 OFFICE O/P EST MOD 30 MIN: CPT | Performed by: PHYSICIAN ASSISTANT

## 2021-05-21 NOTE — TELEPHONE ENCOUNTER
Called and LM for patient to call back, please inform patient of negative rapid strep test, still awaiting covid results. Marine Phillips CMA ([facility])

## 2021-05-21 NOTE — TELEPHONE ENCOUNTER
Patient returned call. Lab result relayed assisted him with getting into mychart for future lab notes.

## 2021-05-21 NOTE — PATIENT INSTRUCTIONS
"Drink plenty of fluids and rest.  May use salt water gargles- about 8 oz warm water with about 1 teaspoon salt  Over the counter pain relievers such as Tylenol or ibuprofen may be used as needed.   Honey lemon tea helps to soothe the throat. \"Throat Coat\" tea is soothing as well.  Take an antihistamine such as Claritin (loratadine), Zyrtec (cetirizine) or Allegra (fexofenadine) daily for allergy symptoms.  Please follow up with primary care provider if not improving, worsening or new symptoms.      Quarantine is for those who have had a close contact to someone who is COVID positive. A close contact is defined as being within 6 feet for 15 minutes or longer. We recommend you stay home and away from others for 14 days to monitor for symptoms. If you develop symptoms, enter isolation guidelines and be tested for COVID-19. ** If you are living with someone who is COVID positive and sharing the same living space, you should quarantine beginning now but the 14 day timeline begins after that person's isolation ends.    Isolation is for those who have symptoms or test positive for COVID-19. You should remain home and away from others for 10 days after your symptoms start. You may return to activities after 10 days as long as you have been fever free for 24 hours and have had an improvement in your symptoms. If you are tested and your test comes back negative, you may return to activities 24 hours after you have been fever free without medication.      Your symptoms show that you may have coronavirus (COVID-19). This illness can cause fever, cough and trouble  breathing. Many people get a mild case and get better on their own. Some people can get very sick.    What should I do?  Starting now: Stay home and away from others (self-isolate) until:    You've had no fever--and no medicine that reduces fever--for 3 full days (72 hours). And     Your other symptoms have gotten better. For example, your cough or breathing has " improved. And     At least 10 days have passed since your symptoms started.    During this time, don t leave the house except for testing or medical care.    Stay in your own room, even for meals. Use your own bathroom if you can.    Stay away from others in your home. No hugging, kissing or shaking hands. No visitors.    Don t go to work, school or anywhere else.    Clean  high touch  surfaces often (doorknobs, counters, handles, etc.). Use a household cleaning  spray or wipes. You ll find a full list of  on the EPA website: www.epa.gov/pesticideregistration/  list-n-disinfectants-use-against-sars-cov-2.    Cover your mouth and nose with a mask, tissue or washcloth to avoid spreading germs.    Wash your hands and face often. Use soap and water.    Caregivers in these groups are at risk for severe illness due to COVID-19:  o People 65 years and older  o People who live in a nursing home or long-term care facility  o People with chronic disease (lung, heart, cancer, diabetes, kidney, liver, immunologic)  o People who have a weakened immune system, including those who:    Are in cancer treatment    Take medicine that weakens the immune system, such as corticosteroids    Had a bone marrow or organ transplant    Have an immune deficiency    Have poorly controlled HIV or AIDS    Are obese (body mass index of 40 or higher)    Smoke regularly  o Caregivers should wear gloves while washing dishes, handling laundry and cleaning  bedrooms and bathrooms.  o Use caution when washing and drying laundry: Don t shake dirty laundry, and use the  warmest water setting that you can.  o For more tips, go to www.cdc.gov/coronavirus/2019-ncov/downloads/10Things.pdf.    How can I take care of myself?  1. Get lots of rest. Drink extra fluids (unless a doctor has told you not to).  2. Take Tylenol (acetaminophen) for fever or pain. If you have liver or kidney problems, ask your family  doctor if it's okay to take Tylenol.  Adults  can take either:    650 mg (two 325 mg pills) every 4 to 6 hours, or     1,000 mg (two 500 mg pills) every 8 hours as needed.    Note: Don't take more than 3,000 mg in one day. Acetaminophen is found in many medicines  (both prescribed and over-the-counter medicines). Read all labels to be sure you don t take too  much.  For children, check the Tylenol bottle for the right dose. The dose is based on the child's age or weight.  3. If you have other health problems (like cancer, heart failure, an organ transplant or severe kidney  disease): Call your specialty clinic if you don't feel better in the next 2 days.  4. Know when to call 911. Emergency warning signs include:    Trouble breathing or shortness of breath    Pain or pressure in the chest that doesn t go away    Feeling confused like you haven t felt before, or not being able to wake up    Bluish-colored lips or face    Where can I get more information?    [facility] - About COVID-19: [email or web address]    CDC - What to Do If You re Sick: www.cdc.gov/coronavirus/2019-ncov/about/steps-when-sick.html    CDC - Ending Home Isolation: www.cdc.gov/coronavirus/2019-ncov/hcp/disposition-in-homepatients.  html    CDC - Caring for Someone: www.cdc.gov/coronavirus/2019-ncov/if-you-are-sick/care-for-someone.html    [facility] - Interim Guidance for Hospital Discharge to Home:  www.health.[facility].[place].us/diseases/coronavirus/hcp/hospdischarge.pdf    [facility] clinical trials (COVID-19 research studies): clinicalaffairs.[facility]-clinicaltrials    Below are the COVID-19 hotlines at the [facility] ([facility]). Interpreters are  available.  o For health questions: Call 522-745-7531 or 1-964.890.9955 (7 a.m. to 7 p.m.)  o For questions about schools and childcare: Call 451-471-4890 or 1-173.848.4201 (7 a.m. to 7 p.m.)

## 2021-08-27 ENCOUNTER — TELEPHONE (OUTPATIENT)
Dept: SURGERY | Facility: CLINIC | Age: 21
End: 2021-08-27

## 2021-08-27 NOTE — TELEPHONE ENCOUNTER
Patients mother called inquiring about an appointment for her son (C2C on file).  This would be for a possible pilonidal cyst.  Reviewed schedule, as patient has mondays off and the next Monday date is 9/13/21.  She is going to see if he can get work off earlier a different day of the week to have this addressed sooner.  Advised the s/s that would warrant seeking care in the mean time.     Medina VELÁSQUEZ, Lead RN, BSN. . .  8/27/2021, 3:45 PM

## 2021-09-13 ENCOUNTER — OFFICE VISIT (OUTPATIENT)
Dept: SURGERY | Facility: CLINIC | Age: 21
End: 2021-09-13
Payer: COMMERCIAL

## 2021-09-13 ENCOUNTER — TELEPHONE (OUTPATIENT)
Dept: SURGERY | Facility: CLINIC | Age: 21
End: 2021-09-13

## 2021-09-13 VITALS
SYSTOLIC BLOOD PRESSURE: 124 MMHG | TEMPERATURE: 98.6 F | BODY MASS INDEX: 26.68 KG/M2 | DIASTOLIC BLOOD PRESSURE: 60 MMHG | WEIGHT: 182 LBS

## 2021-09-13 DIAGNOSIS — L05.91 PILONIDAL CYST: Primary | ICD-10-CM

## 2021-09-13 PROCEDURE — 99213 OFFICE O/P EST LOW 20 MIN: CPT | Performed by: SPECIALIST

## 2021-09-13 NOTE — TELEPHONE ENCOUNTER
Spoke to patient, he needs to discuss with employer and will call another time to schedule surgery

## 2021-09-13 NOTE — LETTER
9/13/2021         RE: Cruzito GOMEZ Case  [address] [facility] [address]        Dear Colleague,    Thank you for referring your patient, Cruzito Rodas, to the [facility]. Please see a copy of my visit note below.    Consult requested by Dr. Azevedo    Reason consultation pilonidal cyst    HPI:  Patient is a 21-year-old white male who for the past several months has noticed a painful lump on his tailbone.  He states it comes and goes is made worse by sitting.  Denies any fevers chills or drainage.  States is way down and improved right now.  He was diagnosed with a pilonidal cyst for which she now was referred.  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    No past medical history on file.    No past surgical history on file.    No current outpatient medications on file.     No current facility-administered medications for this visit.        Allergies   Allergen Reactions     No Known Drug Allergies      Social History     Social History Narrative     Not on file     Family History   Problem Relation Age of Onset     Diabetes No family hx of      Coronary Artery Disease No family hx of      Hypertension No family hx of      Hyperlipidemia No family hx of      Cerebrovascular Disease No family hx of      Breast Cancer No family hx of      Colon Cancer No family hx of      Prostate Cancer No family hx of      Other Cancer No family hx of       ROS: 10 point ROS neg other than the symptoms noted above in the HPI.    PE:  B/P: 124/60, T: 98.6, P: Data Unavailable, R: Data Unavailable  General: well developed, well nourished WM who appears their stated age  HEENT: NC/AT, EOMI, (-)icterus, (-)injection  Neck: Supple, No JVD  Chest: CTA  Heart: S1, S2, (-)m/r/g  Abd: Soft, non tender, non distended, non tender, no masses  Back: Multiple crypts consistent with a pilonidal cyst.  No signs of infection.  Ext; Warm, no edema  Psych: AAOx3  Neuro: No focal  deficits    Impression/plan:  This is a 21-year-old gentleman with a the pilonidal cyst.  I discussed the etiology of pilonidal disease and the need for hair removal including clipping and Silvestre.  He expressed understanding.  I also discussed the role of excision and he wishes to proceed.  After discussion the patient the plan at this time is to proceed with an excision under general anesthesia.   The procedure, risks, benefits, and alternatives were discussed and the patient agrees to proceed.  In the meantime, he will clip and apply Silvestre to the area.  He knows to call me should it appear to be infected for some antibiotics.    Terence Nguyen MD, FACS            Again, thank you for allowing me to participate in the care of your patient.        Sincerely,        Terence Nguyen MD

## 2021-09-13 NOTE — PROGRESS NOTES
Consult requested by Dr. Azevedo    Reason consultation pilonidal cyst    HPI:  Patient is a 21-year-old white male who for the past several months has noticed a painful lump on his tailbone.  He states it comes and goes is made worse by sitting.  Denies any fevers chills or drainage.  States is way down and improved right now.  He was diagnosed with a pilonidal cyst for which she now was referred.  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    No past medical history on file.    No past surgical history on file.    No current outpatient medications on file.     No current facility-administered medications for this visit.        Allergies   Allergen Reactions     No Known Drug Allergies      Social History     Social History Narrative     Not on file     Family History   Problem Relation Age of Onset     Diabetes No family hx of      Coronary Artery Disease No family hx of      Hypertension No family hx of      Hyperlipidemia No family hx of      Cerebrovascular Disease No family hx of      Breast Cancer No family hx of      Colon Cancer No family hx of      Prostate Cancer No family hx of      Other Cancer No family hx of       ROS: 10 point ROS neg other than the symptoms noted above in the HPI.    PE:  B/P: 124/60, T: 98.6, P: Data Unavailable, R: Data Unavailable  General: well developed, well nourished WM who appears their stated age  HEENT: NC/AT, EOMI, (-)icterus, (-)injection  Neck: Supple, No JVD  Chest: CTA  Heart: S1, S2, (-)m/r/g  Abd: Soft, non tender, non distended, non tender, no masses  Back: Multiple crypts consistent with a pilonidal cyst.  No signs of infection.  Ext; Warm, no edema  Psych: AAOx3  Neuro: No focal deficits    Impression/plan:  This is a 21-year-old gentleman with a the pilonidal cyst.  I discussed the etiology of pilonidal disease and the need for hair removal including clipping and Silvestre.  He expressed understanding.  I also discussed the role of  excision and he wishes to proceed.  After discussion the patient the plan at this time is to proceed with an excision under general anesthesia.   The procedure, risks, benefits, and alternatives were discussed and the patient agrees to proceed.  In the meantime, he will clip and apply Silvestre to the area.  He knows to call me should it appear to be infected for some antibiotics.    Terence Nguyen MD, FACS

## 2021-09-16 DIAGNOSIS — Z11.59 ENCOUNTER FOR SCREENING FOR OTHER VIRAL DISEASES: ICD-10-CM

## 2021-09-22 ENCOUNTER — NURSE TRIAGE (OUTPATIENT)
Dept: NURSING | Facility: CLINIC | Age: 21
End: 2021-09-22

## 2021-09-22 ENCOUNTER — VIRTUAL VISIT (OUTPATIENT)
Dept: FAMILY MEDICINE | Facility: CLINIC | Age: 21
End: 2021-09-22
Payer: COMMERCIAL

## 2021-09-22 ENCOUNTER — ALLIED HEALTH/NURSE VISIT (OUTPATIENT)
Dept: FAMILY MEDICINE | Facility: CLINIC | Age: 21
End: 2021-09-22
Payer: COMMERCIAL

## 2021-09-22 DIAGNOSIS — J02.9 ACUTE SORE THROAT: ICD-10-CM

## 2021-09-22 DIAGNOSIS — J02.9 ACUTE SORE THROAT: Primary | ICD-10-CM

## 2021-09-22 LAB — DEPRECATED S PYO AG THROAT QL EIA: NEGATIVE

## 2021-09-22 PROCEDURE — 87651 STREP A DNA AMP PROBE: CPT

## 2021-09-22 PROCEDURE — U0005 INFEC AGEN DETEC AMPLI PROBE: HCPCS

## 2021-09-22 PROCEDURE — 99213 OFFICE O/P EST LOW 20 MIN: CPT | Mod: GT | Performed by: INTERNAL MEDICINE

## 2021-09-22 PROCEDURE — 99207 PR NO CHARGE NURSE ONLY: CPT

## 2021-09-22 PROCEDURE — U0003 INFECTIOUS AGENT DETECTION BY NUCLEIC ACID (DNA OR RNA); SEVERE ACUTE RESPIRATORY SYNDROME CORONAVIRUS 2 (SARS-COV-2) (CORONAVIRUS DISEASE [COVID-19]), AMPLIFIED PROBE TECHNIQUE, MAKING USE OF HIGH THROUGHPUT TECHNOLOGIES AS DESCRIBED BY CMS-2020-01-R: HCPCS

## 2021-09-22 NOTE — PROGRESS NOTES
"Jean Claude is a 21 year old who is being evaluated via a billable video visit.      How would you like to obtain your AVS? MyChart  If the video visit is dropped, the invitation should be resent by: Text to cell phone: [phone]  Will anyone else be joining your video visit? No      Video Start Time: 940 AM    Assessment & Plan     Acute sore throat  Having sore throat since yesterday morning  Feels his glands are swollen  No fever  No body aches  No loss of taste or smell  No diarrhea  No known exposure to Covid  He had strep throat in the past and wants to get tested for it as he feels it could be that although symptoms are not as bad as in the past  No chills or rigors  - Symptomatic COVID-19 Virus (Coronavirus) by PCR Nasopharyngeal; Future  - Streptococcus A Rapid Scr w Reflx to PCR - Lab Collect; Future      15 minutes spent on the date of the encounter doing chart review, history and exam, documentation and further activities per the note       BMI:   Estimated body mass index is 26.68 kg/m  as calculated from the following:    Height as of 12/4/20: 1.759 m (5' 9.25\").    Weight as of 9/13/21: 82.6 kg (182 lb).           Return if symptoms worsen or fail to improve.    Cyrus Jauregui MD  [facility]   Jean Claude is a 21 year old who presents for the following health issues     HPI   Having sore throat  Started yesterday  No cough  Some runny nose  No fever  Painful swallowing  Feels his glands are swollen  History of strep throat in the past  He feels it the same day although not as severe  No exposure to Covid  Not been vaccinated to Covid          Review of Systems   Constitutional, HEENT, cardiovascular, pulmonary, gi and gu systems are negative, except as otherwise noted.      Objective           Vitals:  No vitals were obtained today due to virtual visit.    Physical Exam   GENERAL: Healthy, alert and no distress  EYES: Eyes grossly normal to inspection.  No discharge or " erythema, or obvious scleral/conjunctival abnormalities.  RESP: No audible wheeze, cough, or visible cyanosis.  No visible retractions or increased work of breathing.    SKIN: Visible skin clear. No significant rash, abnormal pigmentation or lesions.  NEURO: Cranial nerves grossly intact.  Mentation and speech appropriate for age.  PSYCH: Mentation appears normal, affect normal/bright, judgement and insight intact, normal speech and appearance well-groomed.                Video-Visit Details    Type of service:  Video Visit    Video End Time:950 AM    Originating Location (pt. Location): Home    Distant Location (provider location):  [facility]     Platform used for Video Visit: Nahomy

## 2021-09-22 NOTE — PATIENT INSTRUCTIONS
Patient Education     Pharyngitis (Sore Throat), Report Pending     Pharyngitis (sore throat) is often due to a virus. It can also be caused by strep (streptococcus) bacteria. This is often called strep throat. Both viral and strep infections can cause throat pain that is worse when swallowing, aching all over, headache, and fever. Both types of infections are contagious. They may be spread by coughing, kissing, or touching others after touching your mouth or nose.   A test has been done to find out if you or your child have strep throat. Often a rapid strep test can be done which gives immediate results and treatment can begin right away. A throat culture may also be done. The culture results take longer. If you have a virus, the culture results will be negative. The facility will call with your culture results. Call this facility or your healthcare provider if you were not given your rapid test results for strep. If a test is positive for strep infection, you will need to take an antibiotic. An antibiotic is a medicine used to treat a bacterial infection. A prescription can be called into your pharmacy or a written copy will be given to you at that time. If both tests are negative, you likely have a virus, usually viral pharyngitis. This does not need to be treated with antibiotics. Until you receive the results of the rapid strep test or the throat culture, you should stay home from work. If your child is being tested, he or she should stay home from school.   Home care    Rest at home. Drink plenty of fluids so you won't get dehydrated.    If the test is positive for strep, you or your child should not go to work or school for the first 24 hours of taking the antibiotics or as directed by the healthcare provider. After this time, you or your child will not be contagious. You or your child can then return to work or school when feeling better or as directed by this facility or your healthcare provider.     Use  the antibiotic medicine (often penicillin or amoxicillin) for the full 10 days or as directed by the healthcare provider. Don't stop the medicine even if you or your child feel better. This is very important to make sure the infection is fully treated. It's also important to prevent medicine-resistant germs from growing. Treatment for 10 days is also the best way to prevent rheumatic fever which affects the heart and other parts of the body. If you or your child were given an antibiotic shot, the healthcare provider will tell you if additional antibiotics are needed.    Use throat lozenges or numbing throat sprays to help reduce pain. Gargling with warm salt water will also help reduce throat pain. Dissolve 1/2 teaspoon of salt in 1 glass of warm water. Discuss this treatment with your healthcare provider.    Children can sip on juice or ice pops. Children 5 years and older can also suck on a lollipop or hard candy.    Don't eat salty or spicy foods or give them to your child. These can irritate the throat.  Other medicine for a child:  You can give your child acetaminophen for fever, fussiness, or discomfort. In babies over 6 months of age, you may use ibuprofen instead of acetaminophen. If your child has chronic liver or kidney disease or ever had a stomach ulcer or gastrointestinal bleeding, talk with your child s healthcare provider before giving these medicines. Aspirin should never be used by any child under 18 years of age who has a fever. It may cause severe liver damage. Always contact your child's healthcare provider before giving him or her over-the-counter medicines for the first time.   Other medicine for an adult: You may use acetaminophen or ibuprofen to control pain or fever, unless another medicine was prescribed for this. If you have chronic liver or kidney disease or ever had a stomach ulcer or gastrointestinal bleeding, talk with your healthcare provider before using these medicines.   Follow-up  "care  Follow up with your healthcare provider or our staff if you or your child don't feel or get better within 72 hours or as directed.   When to get medical advice  Call your healthcare provider right away if any of these occur:     Your child has a fever (see \"Fever and children,\" below)    You have a fever of 100.4 F (38 C) or higher, or as directed    New or worsening ear pain, sinus pain, or headache    Painful lumps in the back of neck    Stiff or swollen neck    Lymph nodes are getting larger or swelling of the neck    Can t open mouth wide due to throat pain    Signs of dehydration, such as very dark urine or no urine or sunken eyes    Noisy breathing    Muffled voice    New rash    Symptoms are worse    New symptoms develop  Call 911  Call 911 if you have any of the following symptoms     Can't swallow, especially saliva, with a lot of drooling    Trouble or difficulty breathing or wheezing    Feeling faint or dizzy    Can't talk    Feeling of doom  Prevention  Here are steps you can take to help prevent an infection:     Keep good hand washing habits.    Don t have close contact with people who have sore throats, colds, or other upper respiratory infections.    Don t smoke, and stay away from secondhand smoke.    Stay up to date with of your vaccines.  Fever and children  Use a digital thermometer to check your child s temperature. Don t use a mercury thermometer. There are different kinds and uses of digital thermometers. They include:     Rectal. For children younger than 3 years, a rectal temperature is the most accurate.    Forehead (temporal). This works for children age 3 months and older. If a child under 3 months old has signs of illness, this can be used for a first pass. The provider may want to confirm with a rectal temperature.    Ear (tympanic). Ear temperatures are accurate after 6 months of age, but not before.    Armpit (axillary). This is the least reliable but may be used for a first " pass to check a child of any age with signs of illness. The provider may want to confirm with a rectal temperature.    Mouth (oral). Don t use a thermometer in your child s mouth until he or she is at least 4 years old.  Use the rectal thermometer with care. Follow the product maker s directions for correct use. Insert it gently. Label it and make sure it s not used in the mouth. It may pass on germs from the stool. If you don t feel OK using a rectal thermometer, ask the healthcare provider what type to use instead. When you talk with any healthcare provider about your child s fever, tell him or her which type you used.   Below are guidelines to know if your young child has a fever. Your child s healthcare provider may give you different numbers for your child. Follow your provider s specific instructions.   Fever readings for a baby under 3 months old:     First, ask your child s healthcare provider how you should take the temperature.    Rectal or forehead: 100.4 F (38 C) or higher    Armpit: 99 F (37.2 C) or higher  Fever readings for a child age 3 months to 36 months (3 years):     Rectal, forehead, or ear: 102 F (38.9 C) or higher    Armpit: 101 F (38.3 C) or higher  Call the healthcare provider in these cases:    Repeated temperature of 104 F (40 C) or higher in a child of any age    Fever of 100.4  (38 C) or higher in a baby younger than 3 months    Fever that lasts more than 24 hours in a child under age 2    Fever that lasts for 3 days in a child age 2 or older    [employer] last reviewed this educational content on 2/1/2020 2000-2021 [employer]. All rights reserved. This information is not intended as a substitute for professional medical care. Always follow your healthcare professional's instructions.           Patient Education     When You Have a Sore Throat  A sore throat can be painful. There are many reasons why you may have a sore throat. Your healthcare provider will work with you  to find the cause of your sore throat. He or she will also find the best treatment for you.     What causes a sore throat?  Sore throats can be caused or worsened by:     Cold or flu viruses    Bacteria    Irritants such as tobacco smoke or air pollution    Acid reflux  A healthy throat  The tonsils are on the sides of the throat near the base of the tongue. They collect viruses and bacteria and help fight infection. The throat (pharynx) is the passage for air. Mucus from the nasal cavity also moves down the passage.   An inflamed throat  The tonsils and pharynx can become inflamed due to a cold or flu virus. Postnasal drip (excess mucus draining from the nasal cavity) can irritate the throat. It can also make the throat or tonsils more likely to be infected by bacteria. Severe, untreated tonsillitis in children or adults can cause a pocket of pus (abscess) to form near the tonsil.   Your evaluation  A health evaluation can help find the cause of your sore throat. It can also help your healthcare provider choose the best treatment for you. The evaluation may include a health history, physical exam, and diagnostic tests.   Health history  Your healthcare provider may ask you the following:     How long has the sore throat lasted and how have you been treating it?    Do you have any other symptoms, such as body aches, fever, or cough?    Does your sore throat recur? If so, how often? How many days of school or work have you missed because of a sore throat?    Do you have trouble eating or swallowing?    Have you been told that you snore or have other sleep problems?    Do you have bad breath?    Do you cough up bad-tasting mucus?  Physical exam  During the exam, your healthcare provider checks your ears, nose, and throat for problems. He or she also checks for swelling in the neck, and may listen to your chest.   Possible tests  Other tests your healthcare provider may perform include:     A throat swab to check for  bacteria such as streptococcus (the bacteria that causes strep throat)    A blood test to check for mononucleosis (a viral infection)    A chest X-ray to rule out pneumonia, especially if you have a cough  Treating a sore throat  Treatment depends on many factors. What is the likely cause? Is the problem recent? Does it keep coming back? In many cases, the best thing to do is to treat the symptoms, rest, and let the problem heal itself. Antibiotics may help clear up some bacterial infections. For cases of severe or recurring tonsillitis, the tonsils may need to be removed.   Relieving your symptoms    Don t smoke, and stay away from secondhand smoke.    For children, try throat sprays or frozen ice pops. Adults and older children may try lozenges.    Drink warm liquids to soothe the throat and help thin mucus. Stay away from alcohol, spicy foods, and acidic drinks such as orange juice. These can irritate the throat.    Gargle with warm saltwater ( 1 teaspoon of salt to  8 ounces of warm water).    Use a humidifier to keep air moist and relieve throat dryness.    Try over-the-counter pain relievers such as acetaminophen or ibuprofen. Use as directed, and don t exceed the recommended dose. Don t give aspirin to children under age17.    Are antibiotics needed?  If your sore throat is due to a bacterial infection, antibiotics may speed healing and prevent complications. Although group A streptococcus (strep throat) is the major treatable infection for a sore throat, strep throat causes only 5% to 15% of sore throats in adults who seek medical care. Most sore throats are caused by cold or flu viruses. And antibiotics don t treat viral illness. In fact, using antibiotics when they re not needed may lead to bacteria that are harder to kill. Your healthcare provider will prescribe antibiotics only if he or she thinks they are likely to help.   If antibiotics are prescribed  Take the medicine exactly as directed. Be sure to  finish your prescription even if you re feeling better. Ask your healthcare provider or pharmacist what side effects are common and what to do about them.   Is surgery needed?  In some cases, tonsils need to be removed. This is often done as outpatient (same-day) surgery. Your healthcare provider may advise removing the tonsils in cases of:     Several severe bouts of tonsillitis in a year.  Severe  episodes include those that lead to missed days of school or work, or that need to be treated with antibiotics.    Tonsillitis that causes breathing problems during sleep    Tonsillitis caused by food particles collecting in pouches in the tonsils (cryptic tonsillitis)  When to call your healthcare provider   Call your healthcare provider immediately if any of the following occur:     Problems swallowing    Symptoms worsen, or new symptoms develop.    Swollen tonsils make breathing difficult.    The pain is severe enough to keep you from drinking liquids.    If a skin rash or hives, develops, call your healthcare provider immediately. Any of these could signal an allergic reaction to antibiotics.    Symptoms don t improve within a week.    Symptoms don t improve within  2 to 3  days of starting antibiotics.  Call 911  Call 911 if any of the following occur:     Trouble breathing or problems catching your breath may be a medical emergency.    Skin is blue, purple or gray in color    Trouble talking    Feeling dizzy or faint    Feeling of doom  [employer] last reviewed this educational content on 7/1/2019 2000-2021 [employer]. All rights reserved. This information is not intended as a substitute for professional medical care. Always follow your healthcare professional's instructions.           Patient Education   After Your COVID-19 (Coronavirus) Test  You have been tested for COVID-19 (coronavirus).   If you'll have surgery in the next few days, we'll let you know ahead of time if you have the virus.  "Please call your surgeon's office with any questions.  For all other patients: Results are usually available in [place] within 2 to 3 days.   If you do not have a [employer] account, you'll get a letter in the mail in about 7 to 10 days.   [employer] is often the fastest way to get test results. Please sign up if you do not already have a [employer] account. See the handout Getting COVID-19 Test Results in [place] for help.  What if my test result is positive?  If your test is positive and you have not viewed your result in [place], you'll get a phone call with your result. (A positive test means that you have the virus.)     Follow the tips under \"How do I self-isolate?\" below for 10 days (20 days if you have a weak immune system).    You don't need to be retested for COVID-19 before going back to school or work. As long as you're fever-free and feeling better, you can go back to school, work and other activities after waiting the 10 or 20 days.  What if I have questions after I get my results?  If you have questions about your results, please visit our testing website at [email or web address].   After 7 to 10 days, if you have not gotten your results:     Call [phone] ([phone]) and ask to speak with our COVID-19 results team.    If you're being treated at an infusion center: Call your infusion center directly.  What are the symptoms of COVID-19?  Cough, fever and trouble breathing are the most common signs of COVID-19.  Other symptoms can include new headaches, new muscle or body aches, new and unexplained fatigue (feeling very tired), chills, sore throat, congestion (stuffy or runny nose), diarrhea (loose poop), loss of taste or smell, belly pain, and nausea or vomiting (feeling sick to your stomach or throwing up).  You may already have symptoms of COVID-19, or they may show up later.  What should I do if I have symptoms?  If you're having surgery: Call your surgeon's " "office.  For all other patients: Stay home and away from others (self-isolate) until ...    You've had no fever--and no medicine that reduces fever--for 1 full day (24 hours), AND    Other symptoms have gotten better. For example, your cough or breathing has improved, AND    At least 10 days have passed since your symptoms first started.  How do I self-isolate?    Stay in your own room, even for meals. Use your own bathroom if you can.    Stay away from others in your home. No hugging, kissing or shaking hands. No visitors.    Don't go to work, school or anywhere else.    Clean \"high touch\" surfaces often (doorknobs, counters, handles). Use household cleaning spray or wipes. You'll find a full list of  on the EPA website: www.epa.gov/pesticide-registration/list-n-disinfectants-use-against-sars-cov-2.    Cover your mouth and nose with a mask or other face covering to avoid spreading germs.    Wash your hands and face often. Use soap and water.    Caregivers in these groups are at risk for severe illness due to COVID-19:  ? People 65 years and older  ? People who live in a nursing home or long-term care facility  ? People with chronic disease (lung, heart, cancer, diabetes, kidney, liver, immunologic)  ? People who have a weakened immune system, including those who:    Are in cancer treatment    Take medicine that weakens the immune system, such as corticosteroids    Had a bone marrow or organ transplant    Have an immune deficiency    Have poorly controlled HIV or AIDS    Are obese (body mass index of 40 or higher)    Smoke regularly    Caregivers should wear gloves while washing dishes, handling laundry and cleaning bedrooms and bathrooms.    Use caution when washing and drying laundry: Don't shake dirty laundry and use the warmest water setting that you can.    For more tips on managing your health at home, go to www.cdc.gov/coronavirus/2019-ncov/downloads/10Things.pdf.  How can I take care of myself at " home?  1. Get lots of rest. Drink extra fluids (unless a doctor has told you not to).  2. Take Tylenol (acetaminophen) for fever or pain. If you have liver or kidney problems, ask your family doctor if it's OK to take Tylenol.   Adults can take either:  ? 650 mg (two 325 mg pills) every 4 to 6 hours, or   ? 1,000 mg (two 500 mg pills) every 8 hours as needed.  ? Note: Don't take more than 3,000 mg in one day. Acetaminophen is found in many medicines (both prescribed and over-the-counter medicines). Read all labels to be sure you don't take too much.   For children, check the Tylenol bottle for the right dose. The dose is based on the child's age or weight.  3. If you have other health problems (like cancer, heart failure, an organ transplant or severe kidney disease): Call your specialty clinic if you don't feel better in the next 2 days.  4. Know when to call 911. Emergency warning signs include:  ? Trouble breathing or shortness of breath  ? Chest pain or pressure that doesn't go away  ? Feeling confused like you haven't felt before, or not being able to wake up  ? Bluish-colored lips or face  5. If your doctor prescribed a blood thinner medicine: Follow their instructions.  Where can I get more information?    [facility] - About COVID-19:   [email or web address]    CDC - If You're Sick: cdc.gov/coronavirus/2019-ncov/about/steps-when-sick.html    CDC - Ending Home Isolation: www.cdc.gov/coronavirus/2019-ncov/hcp/disposition-in-home-patients.html    CDC - Caring for Someone: www.cdc.gov/coronavirus/2019-ncov/if-you-are-sick/care-for-someone.html    [facility] - Interim Guidance for Hospital Discharge to Home: www.health.[facility].[place].us/diseases/coronavirus/hcp/hospdischarge.pdf    [facility] clinical trials (COVID-19 research studies): [email or web address].[email or web address]-clinical-trials    Below are the COVID-19 hotlines at the [place] [facility] ([facility]). Interpreters are available.  ? For  health questions: Call 326-753-1759 or 1-326.812.6608 (7 a.m. to 7 p.m.)  ? For questions about schools and childcare: Call 157-882-5009 or 1-494.214.2359 (7 a.m. to 7 p.m.)    For informational purposes only. Not to replace the advice of your health care provider. Clinically reviewed by Infection Prevention and the [facility] COVID-19 Clinical Team. Copyright   2020 [facility]. All rights reserved. [employer] 624219 - Rev 11/11/20.

## 2021-09-22 NOTE — TELEPHONE ENCOUNTER
Jean Claude has a sore throat.  Cough and shortness of breath is present and stuffy nose and slight headache.  Patient had a virtual visit today with Cyrus Tejada and was told to schedule a strep test with clinic.  Transferred to scheduling.    COVID 19 Nurse Triage Plan/Patient Instructions    Please be aware that novel coronavirus (COVID-19) may be circulating in the community. If you develop symptoms such as fever, cough, or SOB or if you have concerns about the presence of another infection including coronavirus (COVID-19), please contact your health care provider or visit [email or web address].     Disposition/Instructions    In-Person Visit with provider recommended. Reference Visit Selection Guide.    Thank you for taking steps to prevent the spread of this virus.  o Limit your contact with others.  o Wear a simple mask to cover your cough.  o Wash your hands well and often.    Resources    [facility]: About COVID-19: [email or web address]    CDC: What to Do If You're Sick: www.cdc.gov/coronavirus/2019-ncov/about/steps-when-sick.html    CDC: Ending Home Isolation: www.cdc.gov/coronavirus/2019-ncov/hcp/disposition-in-home-patients.html     CDC: Caring for Someone: www.cdc.gov/coronavirus/2019-ncov/if-you-are-sick/care-for-someone.html     [facility]: Interim Guidance for Hospital Discharge to Home: www.health.[facility].[place]/diseases/coronavirus/hcp/hospdischarge.pdf    [facility] clinical trials (COVID-19 research studies): [email or web address].[email or web address]/n-clinical-trials     Below are the COVID-19 hotlines at the [place] Department [facility] ([facility]). Interpreters are available.   o For health questions: Call 061-207-3634 or 1-299.361.8470 (7 a.m. to 7 p.m.)  o For questions about schools and childcare: Call 057-630-0875 or 1-553.342.5785 (7 a.m. to 7 p.m.)

## 2021-09-22 NOTE — TELEPHONE ENCOUNTER
Thinks he has a throat issue. Thinks it's strep. Sore throat, headache, stuffy nose for 2nd day now.  I connected him with scheduling for an appointment and advised urgent care or the ER if they can't get him into the clinic. He understands.  Devorah Cooper RN  [facility]      Reason for Disposition    SEVERE (e.g., excruciating) throat pain     Pain at 8    Additional Information    Negative: Severe difficulty breathing (e.g., struggling for each breath, speaks in single words, stridor)    Negative: Sounds like a life-threatening emergency to the triager    Negative: [1] Diagnosed strep throat AND [2] taking antibiotic AND [3] symptoms continue    Negative: Throat culture results, call about    Negative: Productive cough is main symptom    Negative: Non-productive cough is main symptom    Negative: Hoarseness is main symptom    Negative: Runny nose is main symptom    Negative: [1] Drooling or spitting out saliva (because can't swallow) AND [2] normal breathing    Negative: Unable to open mouth completely    Negative: [1] Difficulty breathing AND [2] not severe    Negative: Fever > 104 F (40 C)    Negative: [1] Refuses to drink anything AND [2] for > 12 hours    Negative: [1] Drinking very little AND [2] dehydration suspected (e.g., no urine > 12 hours, very dry mouth, very lightheaded)    Negative: Patient sounds very sick or weak to the triager    Protocols used: SORE THROAT-A-

## 2021-09-22 NOTE — LETTER
[address] [address]  Phone: [phone]    September 22, 2021        Cruzito GOMEZ Case  [address] [address] [place] [id number]          To whom it may concern:    RE: Cruzito GOMEZ Case    Patient was seen and treated today at our clinic.  Patient was seen and treated today at our clinic and missed work.  He is being tested for Covid and Strep throat .   Return to work depends on the results of the above tests .  Please contact me for questions or concerns.      Sincerely,        Cyrus Jauregui MD

## 2021-09-23 LAB
GROUP A STREP BY PCR: NOT DETECTED
SARS-COV-2 RNA RESP QL NAA+PROBE: NEGATIVE

## 2021-10-03 ENCOUNTER — LAB (OUTPATIENT)
Dept: LAB | Facility: CLINIC | Age: 21
End: 2021-10-03
Payer: COMMERCIAL

## 2021-10-03 DIAGNOSIS — Z11.59 ENCOUNTER FOR SCREENING FOR OTHER VIRAL DISEASES: ICD-10-CM

## 2021-10-03 PROCEDURE — U0005 INFEC AGEN DETEC AMPLI PROBE: HCPCS

## 2021-10-03 PROCEDURE — U0003 INFECTIOUS AGENT DETECTION BY NUCLEIC ACID (DNA OR RNA); SEVERE ACUTE RESPIRATORY SYNDROME CORONAVIRUS 2 (SARS-COV-2) (CORONAVIRUS DISEASE [COVID-19]), AMPLIFIED PROBE TECHNIQUE, MAKING USE OF HIGH THROUGHPUT TECHNOLOGIES AS DESCRIBED BY CMS-2020-01-R: HCPCS

## 2021-10-04 ENCOUNTER — DOCUMENTATION ONLY (OUTPATIENT)
Dept: OTHER | Facility: CLINIC | Age: 21
End: 2021-10-04

## 2021-10-04 ENCOUNTER — OFFICE VISIT (OUTPATIENT)
Dept: FAMILY MEDICINE | Facility: CLINIC | Age: 21
End: 2021-10-04
Payer: COMMERCIAL

## 2021-10-04 VITALS
SYSTOLIC BLOOD PRESSURE: 108 MMHG | BODY MASS INDEX: 26.95 KG/M2 | WEIGHT: 183.8 LBS | DIASTOLIC BLOOD PRESSURE: 72 MMHG | RESPIRATION RATE: 16 BRPM | OXYGEN SATURATION: 98 % | HEART RATE: 108 BPM | TEMPERATURE: 97.2 F

## 2021-10-04 DIAGNOSIS — N52.9 ERECTILE DYSFUNCTION, UNSPECIFIED ERECTILE DYSFUNCTION TYPE: ICD-10-CM

## 2021-10-04 DIAGNOSIS — L05.91 PILONIDAL CYST: ICD-10-CM

## 2021-10-04 DIAGNOSIS — Z01.818 PREOP GENERAL PHYSICAL EXAM: Primary | ICD-10-CM

## 2021-10-04 LAB
ERYTHROCYTE [DISTWIDTH] IN BLOOD BY AUTOMATED COUNT: 12.2 % (ref 10–15)
HCT VFR BLD AUTO: 45.9 % (ref 40–53)
HGB BLD-MCNC: 15.7 G/DL (ref 13.3–17.7)
MCH RBC QN AUTO: 29.1 PG (ref 26.5–33)
MCHC RBC AUTO-ENTMCNC: 34.2 G/DL (ref 31.5–36.5)
MCV RBC AUTO: 85 FL (ref 78–100)
PLATELET # BLD AUTO: 333 10E3/UL (ref 150–450)
RBC # BLD AUTO: 5.4 10E6/UL (ref 4.4–5.9)
SARS-COV-2 RNA RESP QL NAA+PROBE: NEGATIVE
WBC # BLD AUTO: 7 10E3/UL (ref 4–11)

## 2021-10-04 PROCEDURE — 99213 OFFICE O/P EST LOW 20 MIN: CPT | Performed by: PHYSICIAN ASSISTANT

## 2021-10-04 PROCEDURE — 36415 COLL VENOUS BLD VENIPUNCTURE: CPT | Performed by: PHYSICIAN ASSISTANT

## 2021-10-04 PROCEDURE — 85027 COMPLETE CBC AUTOMATED: CPT | Performed by: PHYSICIAN ASSISTANT

## 2021-10-04 ASSESSMENT — PAIN SCALES - GENERAL: PAINLEVEL: NO PAIN (0)

## 2021-10-04 NOTE — LETTER
October 6, 2021      Cruzito Rodas  [address] [address] [place] [id number]        Dear ,    We are writing to inform you of your test results.    The results of your lab work returned grossly within normal range. Good luck with your procedure.       Jose Baker PA-C       Resulted Orders   CBC with platelets   Result Value Ref Range    WBC Count 7.0 4.0 - 11.0 10e3/uL    RBC Count 5.40 4.40 - 5.90 10e6/uL    Hemoglobin 15.7 13.3 - 17.7 g/dL    Hematocrit 45.9 40.0 - 53.0 %    MCV 85 78 - 100 fL    MCH 29.1 26.5 - 33.0 pg    MCHC 34.2 31.5 - 36.5 g/dL    RDW 12.2 10.0 - 15.0 %    Platelet Count 333 150 - 450 10e3/uL       If you have any questions or concerns, please call the clinic at the number listed above.

## 2021-10-04 NOTE — PROGRESS NOTES
[address] [address]  Phone: [phone]  Fax: [phone]  Primary Provider: Julio Azevedo  Pre-op Performing Provider: ARCELIA BEY    PREOPERATIVE EVALUATION:  Today's date: 10/4/2021    Cruzito GOMEZ Case is a 21 year old male who presents for a preoperative evaluation.    Surgical Information:  Surgery/Procedure: EXCISION, PILONIDAL CYST  Surgery Location: [facility]  Surgeon: Dr. Nguyen  Surgery Date: 10/06/2021  Time of Surgery: 7:30 am  Where patient plans to recover: At home with family  Fax number for surgical facility: Note does not need to be faxed, will be available electronically in Epic.    Type of Anesthesia Anticipated: General    Assessment & Plan     The proposed surgical procedure is considered INTERMEDIATE risk.    Preop general physical exam  Pilonidal cyst  Patient was instructed on the pre-operative prep and given a copy a of the instructions with his AVS. He is anxious about the procedure.   - CBC with platelets; Future  - CBC with platelets    Erectile dysfunction, unspecified erectile dysfunction type  Patient instructed to check this in the early AM at a future date.   - Testosterone, total; Future    Risks and Recommendations:  The patient has the following additional risks and recommendations for perioperative complications:   - No identified additional risk factors other than previously addressed    Medication Instructions:  Patient is on no chronic medications    RECOMMENDATION:  APPROVAL GIVEN to proceed with proposed procedure, without further diagnostic evaluation.      Subjective     HPI related to upcoming procedure: Patient denies worsening of his cyst, he does report that it is tender if he sits a certain way.       Preop Questions 10/4/2021   1. Have you ever had a heart attack or stroke? No   2. Have you ever had surgery on your heart or blood vessels, such as a stent placement, a coronary artery  bypass, or surgery on an artery in your head, neck, heart, or legs? No   3. Do you have chest pain with activity? No   4. Do you have a history of  heart failure? No   5. Do you currently have a cold, bronchitis or symptoms of other infection? No   6. Do you have a cough, shortness of breath, or wheezing? No   7. Do you or anyone in your family have previous history of blood clots? No   8. Do you or does anyone in your family have a serious bleeding problem such as prolonged bleeding following surgeries or cuts? No   9. Have you ever had problems with anemia or been told to take iron pills? No   10. Have you had any abnormal blood loss such as black, tarry or bloody stools? No   11. Have you ever had a blood transfusion? No   12. Are you willing to have a blood transfusion if it is medically needed before, during, or after your surgery? Yes   13. Have you or any of your relatives ever had problems with anesthesia? No   14. Do you have sleep apnea, excessive snoring or daytime drowsiness? YES - driving causes him to feel drowsy    14a. Do you have a CPAP machine? No   15. Do you have any artifical heart valves or other implanted medical devices like a pacemaker, defibrillator, or continuous glucose monitor? No   16. Do you have artificial joints? No   17. Are you allergic to latex? No     Health Care Directive:  Patient does not have a Health Care Directive or Living Will: Discussed advance care planning with patient; however, patient declined at this time.    Preoperative Review of :   reviewed - no record of controlled substances prescribed.      Status of Chronic Conditions:  See problem list for active medical problems.  Problems all longstanding and stable, except as noted/documented.  See ROS for pertinent symptoms related to these conditions.      Review of Systems  Constitutional, neuro, ENT, endocrine, pulmonary, cardiac, gastrointestinal, genitourinary, musculoskeletal, integument and psychiatric  systems are negative, except as otherwise noted.    Patient Active Problem List    Diagnosis Date Noted     Acne 08/15/2012     Priority: Medium      History reviewed. No pertinent past medical history.  History reviewed. No pertinent surgical history.  No current outpatient medications on file.       Allergies   Allergen Reactions     No Known Drug Allergies         Social History     Tobacco Use     Smoking status: Never Smoker     Smokeless tobacco: Never Used   Substance Use Topics     Alcohol use: Yes     Comment: occasional       History   Drug Use No         Objective     /72   Pulse 108   Temp 97.2  F (36.2  C) (Temporal)   Resp 16   Wt 83.4 kg (183 lb 12.8 oz)   SpO2 98%   BMI 26.95 kg/m      Physical Exam    GENERAL APPEARANCE: healthy, alert and no distress     EYES: EOMI,  PERRL     HENT: ear canals and TM's normal and nose and mouth without ulcers or lesions     NECK: no adenopathy, no asymmetry, masses, or scars and thyroid normal to palpation     RESP: lungs clear to auscultation - no rales, rhonchi or wheezes     CV: regular rates and rhythm, normal S1 S2, no S3 or S4 and no murmur, click or rub     ABDOMEN:  soft, nontender, no HSM or masses and bowel sounds normal     MS: extremities normal- no gross deformities noted, no evidence of inflammation in joints, FROM in all extremities.     NEURO: Normal strength and tone, sensory exam grossly normal, mentation intact and speech normal     PSYCH: mentation appears normal. and affect normal/bright     LYMPHATICS: No cervical adenopathy    No results for input(s): HGB, PLT, INR, NA, POTASSIUM, CR, A1C in the last 93740 hours.     Diagnostics:  No labs were ordered during this visit.       Revised Cardiac Risk Index (RCRI):  The patient has the following serious cardiovascular risks for perioperative complications:   - No serious cardiac risks = 0 points     RCRI Interpretation: 0 points: Class I (very low risk - 0.4% complication rate)            Signed Electronically by: Jose Baker PA-C  Copy of this evaluation report is provided to requesting physician.

## 2021-10-04 NOTE — PATIENT INSTRUCTIONS
[facility]       Preparing for Your Surgery  Getting started  A nurse will call you to review your health history and instructions. They will give you an arrival time based on your scheduled surgery time.  Please be ready to share the following:    Your doctor's clinic name and phone number    Your medical, surgical and anesthesia history    A list of allergies and sensitivities    A list of medicines, including herbal treatments and over-the-counter drugs    Whether the patient has a legal guardian (ask how to send us the papers in advance)  If you have a child who's having surgery, please ask for a copy of Preparing for Your Child's Surgery.    Preparing for surgery    Within 30 days of surgery: Have a pre-op exam (sometimes called an H&P, or History and Physical). This can be done at a clinic or pre-operative center.  ? If you're having a , you may not need this exam. Talk to your care team    At your pre-op exam, talk to your care team about all medicines you take. If you need to stop any medicines before surgery, ask when to start taking them again.  ? We do this for your safety. Many medicines can make you bleed too much during surgery. Some change how well surgery (anesthesia) drugs work.    Call your insurance company to let them know you're having surgery. (If you don't have insurance, call [phone].)    Call your clinic if there's any change in your health. This includes signs of a cold or flu (sore throat, runny nose, cough, rash, fever). It also includes a scrape or scratch near the surgery site.    If you have questions on the day of surgery, call your hospital or surgery center.  Eating and drinking guidelines  For your safety: Unless your surgeon tells you otherwise, follow the guidelines below.    Eat and drink as usual until 8 hours before surgery. After that, no food or milk.    Drink clear liquids until 2 hours before surgery. These are liquids you can see through, like  water, Gatorade and Propel Water. You may also have black coffee and tea (no cream or milk).    Nothing by mouth within 2 hours of surgery. This includes gum, candy and breath mints.    If you drink, stop drinking alcohol the night before surgery.    If your care team tells you to take medicine on the morning of surgery, it's okay to take it with a sip of water.  Preventing infection    Shower or bathe the night before and morning of your surgery. Follow the instructions your clinic gave you. (If no instructions, use regular soap.)    Don't shave or clip hair near your surgery site. We'll remove the hair if needed.    Don't smoke or vape the morning of surgery. You may chew nicotine gum up to 2 hours before surgery. A nicotine patch is okay.  ? Note: Some surgeries require you to completely quit smoking and nicotine. Check with your surgeon.    Your care team will make every effort to keep you safe from infection. We will:  ? Clean our hands often with soap and water (or an alcohol-based hand rub).  ? Clean the skin at your surgery site with a special soap that kills germs.  ? Give you a special gown to keep you warm. (Cold raises the risk of infection.)  ? Wear special hair covers, masks, gowns and gloves during surgery.  ? Give antibiotic medicine, if prescribed. Not all surgeries need antibiotics.    1 week prior to the procedure stop all use of NSAIDs (ibuprofen, advil, motrin, aleve, naproxen, aspirin, etc)    Tylenol or acetaminophen is aok  What to bring on the day of surgery    Photo ID and insurance card    Copy of your health care directive, if you have one    Glasses and hearing aides (bring cases)  ? You can't wear contacts during surgery    Inhaler and eye drops, if you use them (tell us about these when you arrive)    CPAP machine or breathing device, if you use them    A few personal items, if spending the night    If you have . . .  ? A pacemaker or ICD (cardiac defibrillator): Bring the ID  card.  ? An implanted stimulator: Bring the remote control.  ? A legal guardian: Bring a copy of the certified (court-stamped) guardianship papers.  Please remove any jewelry, including body piercings. Leave jewelry and other valuables at home.  If you're going home the day of surgery  Important: If you don't follow the rules below, we must cancel your surgery.     Arrange for someone to drive you home after surgery. You may not drive, take a taxi or take public transportation by yourself (unless you'll have local anesthesia only).    Arrange for a responsible adult to stay with you overnight. If you don't, we may keep you in the hospital overnight, and you may need to pay the costs yourself.  Questions?   If you have any questions for your care team, list them here: _________________________________________________________________________________________________________________________________________________________________________________________________________________________________________________________________________________________________________________________  For informational purposes only. Not to replace the advice of your health care provider. Copyright   2003, 2019 [facility]. All rights reserved. Clinically reviewed by Komal Styles MD. [employer] 244330 - REV 4/20.

## 2021-10-04 NOTE — H&P (VIEW-ONLY)
[address] [address]  Phone: [phone]  Fax: [phone]  Primary Provider: Julio Azevedo  Pre-op Performing Provider: ARCELIA BEY    PREOPERATIVE EVALUATION:  Today's date: 10/4/2021    Cruzito GOMEZ Case is a 21 year old male who presents for a preoperative evaluation.    Surgical Information:  Surgery/Procedure: EXCISION, PILONIDAL CYST  Surgery Location: [facility]  Surgeon: Dr. Nguyen  Surgery Date: 10/06/2021  Time of Surgery: 7:30 am  Where patient plans to recover: At home with family  Fax number for surgical facility: Note does not need to be faxed, will be available electronically in Epic.    Type of Anesthesia Anticipated: General    Assessment & Plan     The proposed surgical procedure is considered INTERMEDIATE risk.    Preop general physical exam  Pilonidal cyst  Patient was instructed on the pre-operative prep and given a copy a of the instructions with his AVS. He is anxious about the procedure.   - CBC with platelets; Future  - CBC with platelets    Erectile dysfunction, unspecified erectile dysfunction type  Patient instructed to check this in the early AM at a future date.   - Testosterone, total; Future    Risks and Recommendations:  The patient has the following additional risks and recommendations for perioperative complications:   - No identified additional risk factors other than previously addressed    Medication Instructions:  Patient is on no chronic medications    RECOMMENDATION:  APPROVAL GIVEN to proceed with proposed procedure, without further diagnostic evaluation.      Subjective     HPI related to upcoming procedure: Patient denies worsening of his cyst, he does report that it is tender if he sits a certain way.       Preop Questions 10/4/2021   1. Have you ever had a heart attack or stroke? No   2. Have you ever had surgery on your heart or blood vessels, such as a stent placement, a coronary artery  bypass, or surgery on an artery in your head, neck, heart, or legs? No   3. Do you have chest pain with activity? No   4. Do you have a history of  heart failure? No   5. Do you currently have a cold, bronchitis or symptoms of other infection? No   6. Do you have a cough, shortness of breath, or wheezing? No   7. Do you or anyone in your family have previous history of blood clots? No   8. Do you or does anyone in your family have a serious bleeding problem such as prolonged bleeding following surgeries or cuts? No   9. Have you ever had problems with anemia or been told to take iron pills? No   10. Have you had any abnormal blood loss such as black, tarry or bloody stools? No   11. Have you ever had a blood transfusion? No   12. Are you willing to have a blood transfusion if it is medically needed before, during, or after your surgery? Yes   13. Have you or any of your relatives ever had problems with anesthesia? No   14. Do you have sleep apnea, excessive snoring or daytime drowsiness? YES - driving causes him to feel drowsy    14a. Do you have a CPAP machine? No   15. Do you have any artifical heart valves or other implanted medical devices like a pacemaker, defibrillator, or continuous glucose monitor? No   16. Do you have artificial joints? No   17. Are you allergic to latex? No     Health Care Directive:  Patient does not have a Health Care Directive or Living Will: Discussed advance care planning with patient; however, patient declined at this time.    Preoperative Review of :   reviewed - no record of controlled substances prescribed.      Status of Chronic Conditions:  See problem list for active medical problems.  Problems all longstanding and stable, except as noted/documented.  See ROS for pertinent symptoms related to these conditions.      Review of Systems  Constitutional, neuro, ENT, endocrine, pulmonary, cardiac, gastrointestinal, genitourinary, musculoskeletal, integument and psychiatric  systems are negative, except as otherwise noted.    Patient Active Problem List    Diagnosis Date Noted     Acne 08/15/2012     Priority: Medium      History reviewed. No pertinent past medical history.  History reviewed. No pertinent surgical history.  No current outpatient medications on file.       Allergies   Allergen Reactions     No Known Drug Allergies         Social History     Tobacco Use     Smoking status: Never Smoker     Smokeless tobacco: Never Used   Substance Use Topics     Alcohol use: Yes     Comment: occasional       History   Drug Use No         Objective     /72   Pulse 108   Temp 97.2  F (36.2  C) (Temporal)   Resp 16   Wt 83.4 kg (183 lb 12.8 oz)   SpO2 98%   BMI 26.95 kg/m      Physical Exam    GENERAL APPEARANCE: healthy, alert and no distress     EYES: EOMI,  PERRL     HENT: ear canals and TM's normal and nose and mouth without ulcers or lesions     NECK: no adenopathy, no asymmetry, masses, or scars and thyroid normal to palpation     RESP: lungs clear to auscultation - no rales, rhonchi or wheezes     CV: regular rates and rhythm, normal S1 S2, no S3 or S4 and no murmur, click or rub     ABDOMEN:  soft, nontender, no HSM or masses and bowel sounds normal     MS: extremities normal- no gross deformities noted, no evidence of inflammation in joints, FROM in all extremities.     NEURO: Normal strength and tone, sensory exam grossly normal, mentation intact and speech normal     PSYCH: mentation appears normal. and affect normal/bright     LYMPHATICS: No cervical adenopathy    No results for input(s): HGB, PLT, INR, NA, POTASSIUM, CR, A1C in the last 83997 hours.     Diagnostics:  No labs were ordered during this visit.       Revised Cardiac Risk Index (RCRI):  The patient has the following serious cardiovascular risks for perioperative complications:   - No serious cardiac risks = 0 points     RCRI Interpretation: 0 points: Class I (very low risk - 0.4% complication rate)            Signed Electronically by: Jose Baker PA-C  Copy of this evaluation report is provided to requesting physician.

## 2021-10-06 ENCOUNTER — HOSPITAL ENCOUNTER (OUTPATIENT)
Facility: CLINIC | Age: 21
Discharge: HOME | End: 2021-10-06
Attending: SPECIALIST | Admitting: SPECIALIST
Payer: COMMERCIAL

## 2021-10-06 ENCOUNTER — ANESTHESIA (OUTPATIENT)
Dept: SURGERY | Facility: CLINIC | Age: 21
End: 2021-10-06
Payer: COMMERCIAL

## 2021-10-06 ENCOUNTER — ANESTHESIA EVENT (OUTPATIENT)
Dept: SURGERY | Facility: CLINIC | Age: 21
End: 2021-10-06
Payer: COMMERCIAL

## 2021-10-06 VITALS
RESPIRATION RATE: 20 BRPM | OXYGEN SATURATION: 97 % | TEMPERATURE: 97.9 F | SYSTOLIC BLOOD PRESSURE: 111 MMHG | DIASTOLIC BLOOD PRESSURE: 65 MMHG | HEART RATE: 58 BPM

## 2021-10-06 DIAGNOSIS — G89.18 POST-OP PAIN: Primary | ICD-10-CM

## 2021-10-06 DIAGNOSIS — L05.91 PILONIDAL CYST: ICD-10-CM

## 2021-10-06 PROCEDURE — 258N000003 HC RX IP 258 OP 636: Performed by: NURSE ANESTHETIST, CERTIFIED REGISTERED

## 2021-10-06 PROCEDURE — 11771 EXC PILONIDAL CYST XTNSV: CPT | Performed by: SPECIALIST

## 2021-10-06 PROCEDURE — 272N000001 HC OR GENERAL SUPPLY STERILE: Performed by: SPECIALIST

## 2021-10-06 PROCEDURE — 360N000075 HC SURGERY LEVEL 2, PER MIN: Performed by: SPECIALIST

## 2021-10-06 PROCEDURE — 250N000009 HC RX 250: Performed by: NURSE ANESTHETIST, CERTIFIED REGISTERED

## 2021-10-06 PROCEDURE — 250N000011 HC RX IP 250 OP 636: Performed by: NURSE ANESTHETIST, CERTIFIED REGISTERED

## 2021-10-06 PROCEDURE — 88304 TISSUE EXAM BY PATHOLOGIST: CPT | Mod: TC | Performed by: SPECIALIST

## 2021-10-06 PROCEDURE — 710N000012 HC RECOVERY PHASE 2, PER MINUTE: Performed by: SPECIALIST

## 2021-10-06 PROCEDURE — 250N000009 HC RX 250: Performed by: SPECIALIST

## 2021-10-06 PROCEDURE — 370N000017 HC ANESTHESIA TECHNICAL FEE, PER MIN: Performed by: SPECIALIST

## 2021-10-06 PROCEDURE — 250N000026 HC DESFLURANE, PER MIN: Performed by: SPECIALIST

## 2021-10-06 PROCEDURE — 250N000013 HC RX MED GY IP 250 OP 250 PS 637: Performed by: SPECIALIST

## 2021-10-06 PROCEDURE — 999N000141 HC STATISTIC PRE-PROCEDURE NURSING ASSESSMENT: Performed by: SPECIALIST

## 2021-10-06 PROCEDURE — 250N000011 HC RX IP 250 OP 636: Performed by: SPECIALIST

## 2021-10-06 PROCEDURE — 710N000010 HC RECOVERY PHASE 1, LEVEL 2, PER MIN: Performed by: SPECIALIST

## 2021-10-06 RX ORDER — FENTANYL CITRATE 50 UG/ML
50 INJECTION, SOLUTION INTRAMUSCULAR; INTRAVENOUS EVERY 5 MIN PRN
Status: DISCONTINUED | OUTPATIENT
Start: 2021-10-06 | End: 2021-10-06 | Stop reason: HOSPADM

## 2021-10-06 RX ORDER — PROPOFOL 10 MG/ML
INJECTION, EMULSION INTRAVENOUS PRN
Status: DISCONTINUED | OUTPATIENT
Start: 2021-10-06 | End: 2021-10-06

## 2021-10-06 RX ORDER — KETOROLAC TROMETHAMINE 30 MG/ML
INJECTION, SOLUTION INTRAMUSCULAR; INTRAVENOUS PRN
Status: DISCONTINUED | OUTPATIENT
Start: 2021-10-06 | End: 2021-10-06

## 2021-10-06 RX ORDER — LIDOCAINE 40 MG/G
CREAM TOPICAL
Status: DISCONTINUED | OUTPATIENT
Start: 2021-10-06 | End: 2021-10-06 | Stop reason: HOSPADM

## 2021-10-06 RX ORDER — OXYCODONE AND ACETAMINOPHEN 5; 325 MG/1; MG/1
1-2 TABLET ORAL EVERY 6 HOURS PRN
Qty: 12 TABLET | Refills: 0 | Status: SHIPPED | OUTPATIENT
Start: 2021-10-06 | End: 2021-10-09

## 2021-10-06 RX ORDER — OXYCODONE AND ACETAMINOPHEN 5; 325 MG/1; MG/1
2 TABLET ORAL
Status: COMPLETED | OUTPATIENT
Start: 2021-10-06 | End: 2021-10-06

## 2021-10-06 RX ORDER — DEXAMETHASONE SODIUM PHOSPHATE 4 MG/ML
INJECTION, SOLUTION INTRA-ARTICULAR; INTRALESIONAL; INTRAMUSCULAR; INTRAVENOUS; SOFT TISSUE PRN
Status: DISCONTINUED | OUTPATIENT
Start: 2021-10-06 | End: 2021-10-06

## 2021-10-06 RX ORDER — SODIUM CHLORIDE, SODIUM LACTATE, POTASSIUM CHLORIDE, CALCIUM CHLORIDE 600; 310; 30; 20 MG/100ML; MG/100ML; MG/100ML; MG/100ML
INJECTION, SOLUTION INTRAVENOUS CONTINUOUS
Status: DISCONTINUED | OUTPATIENT
Start: 2021-10-06 | End: 2021-10-06 | Stop reason: HOSPADM

## 2021-10-06 RX ORDER — ONDANSETRON 2 MG/ML
INJECTION INTRAMUSCULAR; INTRAVENOUS PRN
Status: DISCONTINUED | OUTPATIENT
Start: 2021-10-06 | End: 2021-10-06

## 2021-10-06 RX ORDER — FENTANYL CITRATE 50 UG/ML
25 INJECTION, SOLUTION INTRAMUSCULAR; INTRAVENOUS
Status: DISCONTINUED | OUTPATIENT
Start: 2021-10-06 | End: 2021-10-06 | Stop reason: HOSPADM

## 2021-10-06 RX ORDER — CEFAZOLIN SODIUM 2 G/100ML
2 INJECTION, SOLUTION INTRAVENOUS
Status: COMPLETED | OUTPATIENT
Start: 2021-10-06 | End: 2021-10-06

## 2021-10-06 RX ORDER — OXYCODONE HYDROCHLORIDE 5 MG/1
5 TABLET ORAL EVERY 4 HOURS PRN
Status: DISCONTINUED | OUTPATIENT
Start: 2021-10-06 | End: 2021-10-06 | Stop reason: HOSPADM

## 2021-10-06 RX ORDER — HYDROMORPHONE HYDROCHLORIDE 1 MG/ML
0.5 INJECTION, SOLUTION INTRAMUSCULAR; INTRAVENOUS; SUBCUTANEOUS EVERY 5 MIN PRN
Status: DISCONTINUED | OUTPATIENT
Start: 2021-10-06 | End: 2021-10-06 | Stop reason: HOSPADM

## 2021-10-06 RX ORDER — LIDOCAINE HYDROCHLORIDE 20 MG/ML
INJECTION, SOLUTION INFILTRATION; PERINEURAL PRN
Status: DISCONTINUED | OUTPATIENT
Start: 2021-10-06 | End: 2021-10-06

## 2021-10-06 RX ORDER — CEFAZOLIN SODIUM 2 G/100ML
2 INJECTION, SOLUTION INTRAVENOUS SEE ADMIN INSTRUCTIONS
Status: DISCONTINUED | OUTPATIENT
Start: 2021-10-06 | End: 2021-10-06 | Stop reason: HOSPADM

## 2021-10-06 RX ORDER — ONDANSETRON 4 MG/1
4 TABLET, ORALLY DISINTEGRATING ORAL EVERY 30 MIN PRN
Status: DISCONTINUED | OUTPATIENT
Start: 2021-10-06 | End: 2021-10-06 | Stop reason: HOSPADM

## 2021-10-06 RX ORDER — FENTANYL CITRATE 50 UG/ML
INJECTION, SOLUTION INTRAMUSCULAR; INTRAVENOUS PRN
Status: DISCONTINUED | OUTPATIENT
Start: 2021-10-06 | End: 2021-10-06

## 2021-10-06 RX ORDER — ONDANSETRON 2 MG/ML
4 INJECTION INTRAMUSCULAR; INTRAVENOUS EVERY 30 MIN PRN
Status: DISCONTINUED | OUTPATIENT
Start: 2021-10-06 | End: 2021-10-06 | Stop reason: HOSPADM

## 2021-10-06 RX ORDER — MEPERIDINE HYDROCHLORIDE 25 MG/ML
12.5 INJECTION INTRAMUSCULAR; INTRAVENOUS; SUBCUTANEOUS
Status: DISCONTINUED | OUTPATIENT
Start: 2021-10-06 | End: 2021-10-06 | Stop reason: HOSPADM

## 2021-10-06 RX ORDER — BUPIVACAINE HYDROCHLORIDE AND EPINEPHRINE 2.5; 5 MG/ML; UG/ML
INJECTION, SOLUTION INFILTRATION; PERINEURAL PRN
Status: DISCONTINUED | OUTPATIENT
Start: 2021-10-06 | End: 2021-10-06 | Stop reason: HOSPADM

## 2021-10-06 RX ADMIN — MEPERIDINE HYDROCHLORIDE 12.5 MG: 25 INJECTION INTRAMUSCULAR; INTRAVENOUS; SUBCUTANEOUS at 09:05

## 2021-10-06 RX ADMIN — ROCURONIUM BROMIDE 40 MG: 50 INJECTION, SOLUTION INTRAVENOUS at 07:34

## 2021-10-06 RX ADMIN — DEXAMETHASONE SODIUM PHOSPHATE 10 MG: 4 INJECTION, SOLUTION INTRA-ARTICULAR; INTRALESIONAL; INTRAMUSCULAR; INTRAVENOUS; SOFT TISSUE at 07:54

## 2021-10-06 RX ADMIN — MIDAZOLAM 2 MG: 1 INJECTION INTRAMUSCULAR; INTRAVENOUS at 07:30

## 2021-10-06 RX ADMIN — HYDROMORPHONE HYDROCHLORIDE 0.25 MG: 1 INJECTION, SOLUTION INTRAMUSCULAR; INTRAVENOUS; SUBCUTANEOUS at 08:10

## 2021-10-06 RX ADMIN — ONDANSETRON 4 MG: 2 INJECTION INTRAMUSCULAR; INTRAVENOUS at 07:54

## 2021-10-06 RX ADMIN — PROPOFOL 200 MG: 10 INJECTION, EMULSION INTRAVENOUS at 07:33

## 2021-10-06 RX ADMIN — SODIUM CHLORIDE, POTASSIUM CHLORIDE, SODIUM LACTATE AND CALCIUM CHLORIDE: 600; 310; 30; 20 INJECTION, SOLUTION INTRAVENOUS at 07:30

## 2021-10-06 RX ADMIN — LIDOCAINE HYDROCHLORIDE 80 MG: 20 INJECTION, SOLUTION INFILTRATION; PERINEURAL at 07:33

## 2021-10-06 RX ADMIN — FENTANYL CITRATE 50 MCG: 50 INJECTION, SOLUTION INTRAMUSCULAR; INTRAVENOUS at 07:30

## 2021-10-06 RX ADMIN — FENTANYL CITRATE 50 MCG: 50 INJECTION, SOLUTION INTRAMUSCULAR; INTRAVENOUS at 07:33

## 2021-10-06 RX ADMIN — KETOROLAC TROMETHAMINE 30 MG: 30 INJECTION, SOLUTION INTRAMUSCULAR at 08:27

## 2021-10-06 RX ADMIN — CEFAZOLIN SODIUM 2 G: 2 INJECTION, SOLUTION INTRAVENOUS at 07:27

## 2021-10-06 RX ADMIN — SUGAMMADEX 200 MG: 100 INJECTION, SOLUTION INTRAVENOUS at 08:27

## 2021-10-06 RX ADMIN — OXYCODONE HYDROCHLORIDE AND ACETAMINOPHEN 2 TABLET: 5; 325 TABLET ORAL at 09:38

## 2021-10-06 NOTE — ANESTHESIA PROCEDURE NOTES
Airway       Patient location during procedure: OR       Procedure Start/Stop Times: 10/6/2021 7:36 AM  Staff -        CRNA: Ricco Esquivel APRN CRNA       Performed By: CRNA  Consent for Airway        Urgency: elective  Indications and Patient Condition       Indications for airway management: germaine-procedural       Induction type:intravenous       Mask difficulty assessment: 1 - vent by mask    Final Airway Details       Final airway type: endotracheal airway       Successful airway: ETT - single  Endotracheal Airway Details        ETT size (mm): 7.5       Cuffed: yes       Successful intubation technique: direct laryngoscopy       DL Blade Type: Aggarwal 2       Grade View of Cords: 1       Adjucts: stylet       Position: Right       Measured from: lips       Bite block used: Oral Airway    Post intubation assessment        Placement verified by: capnometry, equal breath sounds and chest rise        Number of attempts at approach: 1       Number of other approaches attempted: 0       Secured with: plastic tape       Ease of procedure: easy       Dentition: Intact and Unchanged

## 2021-10-06 NOTE — ANESTHESIA POSTPROCEDURE EVALUATION
Patient: Cruzito GOMEZ Case    Procedure: Procedure(s):  EXCISION, PILONIDAL CYST       Diagnosis:Pilonidal cyst [L05.91]  Diagnosis Additional Information: No value filed.    Anesthesia Type:  General    Note:  Disposition: Outpatient   Postop Pain Control: Uneventful            Sign Out: Well controlled pain   PONV: No   Neuro/Psych: Uneventful            Sign Out: Acceptable/Baseline neuro status   Airway/Respiratory: Uneventful            Sign Out: Acceptable/Baseline resp. status   CV/Hemodynamics: Uneventful            Sign Out: Acceptable CV status   Other NRE: NONE   DID A NON-ROUTINE EVENT OCCUR? No    Event details/Postop Comments:  Pt was happy with anesthesia care.  No complications.  I will follow up with the pt if needed.           Last vitals:  Vitals Value Taken Time   /68 10/06/21 0915   Temp 97.7  F (36.5  C) 10/06/21 0919   Pulse 69 10/06/21 0919   Resp 22 10/06/21 0919   SpO2 97 % 10/06/21 0919   Vitals shown include unvalidated device data.    Electronically Signed By: SID Caceres CRNA  October 6, 2021  10:21 AM

## 2021-10-06 NOTE — ANESTHESIA PREPROCEDURE EVALUATION
Anesthesia Pre-Procedure Evaluation    Patient: Cruzito GOMEZ Case   MRN: [id number] : 2000        Preoperative Diagnosis: Pilonidal cyst [L05.91]    Procedure : Procedure(s):  EXCISION, PILONIDAL CYST          No past medical history on file.   No past surgical history on file.   Allergies   Allergen Reactions     No Known Drug Allergies       Social History     Tobacco Use     Smoking status: Never Smoker     Smokeless tobacco: Never Used   Substance Use Topics     Alcohol use: Yes     Comment: occasional      Wt Readings from Last 1 Encounters:   10/04/21 83.4 kg (183 lb 12.8 oz)        Anesthesia Evaluation   Pt has not had prior anesthetic         ROS/MED HX  ENT/Pulmonary:  - neg pulmonary ROS     Neurologic:  - neg neurologic ROS     Cardiovascular:  - neg cardiovascular ROS     METS/Exercise Tolerance:     Hematologic:  - neg hematologic  ROS     Musculoskeletal:  - neg musculoskeletal ROS     GI/Hepatic:  - neg GI/hepatic ROS     Renal/Genitourinary:  - neg Renal ROS     Endo:  - neg endo ROS     Psychiatric/Substance Use:  - neg psychiatric ROS     Infectious Disease:  - neg infectious disease ROS     Malignancy:  - neg malignancy ROS     Other:  - neg other ROS          Physical Exam    Airway        Mallampati: I   TM distance: > 3 FB   Neck ROM: full   Mouth opening: > 3 cm    Respiratory Devices and Support         Dental  no notable dental history         Cardiovascular   cardiovascular exam normal          Pulmonary   pulmonary exam normal                OUTSIDE LABS:  CBC:   Lab Results   Component Value Date    WBC 7.0 10/04/2021    WBC 4.3 2016    HGB 15.7 10/04/2021    HGB 15.2 2016    HCT 45.9 10/04/2021    HCT 44.0 2016     10/04/2021     2016     BMP:   Lab Results   Component Value Date    BUN 7 2016    BUN 10 2016    CR 0.71 2016    CR 0.82 2016     COAGS: No results found for: PTT, INR, FIBR  POC: No results found for: BGM,  HCG, HCGS  HEPATIC:   Lab Results   Component Value Date    ALBUMIN 4.0 07/14/2016    PROTTOTAL 7.7 07/14/2016    ALT 35 07/14/2016    AST 26 07/14/2016    ALKPHOS 106 07/14/2016    BILITOTAL 0.6 07/14/2016     OTHER: No results found for: PH, LACT, A1C, NENA, PHOS, MAG, LIPASE, AMYLASE, TSH, T4, T3, CRP, SED    Anesthesia Plan    ASA Status:  1   NPO Status:  NPO Appropriate    Anesthesia Type: General.     - Airway: ETT   Induction: Propofol, Intravenous.   Maintenance: Balanced.        Consents    Anesthesia Plan(s) and associated risks, benefits, and realistic alternatives discussed. Questions answered and patient/representative(s) expressed understanding.     - Discussed with:  Patient      - Extended Intubation/Ventilatory Support Discussed: No.      - Patient is DNR/DNI Status: No    Use of blood products discussed: No .     Postoperative Care    Pain management: IV analgesics.   PONV prophylaxis: Ondansetron (or other 5HT-3), Dexamethasone or Solumedrol     Comments:    The risks and benefits of anesthesia, and the alternatives where applicable, have been discussed with the patient, and they wish to proceed.               SID Caceres CRNA

## 2021-10-06 NOTE — OP NOTE
Procedure Date: 10/06/2021    PREOPERATIVE DIAGNOSIS:  Pilonidal cyst.    POSTOPERATIVE DIAGNOSIS:  Pilonidal cyst.    PROCEDURE PERFORMED:  Excision of pilonidal cyst.    SURGEON:  Terence Nguyen MD, FACS    ANESTHESIA:  General via endotracheal tube.    INDICATIONS FOR PROCEDURE:  This is a 21-year-old gentleman initially presented with an infected pilonidal cyst and did have a drainage at some point, he fully recovered from that and to prevent a recurrence he opted to have it excised.    OPERATIVE FINDINGS:  Included pilonidal cyst with a tract running superiorly.    DESCRIPTION OF PROCEDURE:  The patient was taken to the operating room and left on the stretcher in the supine position.  After induction of anesthesia, was placed on the table in the prone position.  The lower back was then prepped and draped in sterile fashion.  A timeout was performed confirming the day and the patient as well as the procedure to be performed.  The tract was initially probed and found to extend cephalad and the top aspect of the tract was marked.  An elliptical incision was then made, favoring the left side of the patient to allow closure off the midline.  After making an elliptical incision with a scalpel the remainder of the cyst was removed using cautery.  After excising using cautery and submitted as specimen, we then undermined the right side of the patient and then the defect was closed using interrupted 3-0 Vicryl, pulling the closure off the midline down to the subcutaneous tissue and fascia.  The skin was closed using vertical mattress 3-0 nylon.  Again, pulling the closure approximately 1 cm off the midline.  Sterile dressings were applied.  The patient was taken from the operating room to recovery in stable condition to be sent home.    Terence Nguyen MD, FACS        D: 10/06/2021   T: 10/06/2021   MT: [id number]    Name:     KAYA GARCIA  MRN:      [id number]        Account:        [id number]   :       2000           Procedure Date: 10/06/2021     Document: [id number]

## 2021-10-06 NOTE — ANESTHESIA CARE TRANSFER NOTE
Patient: Cruzito GOMEZ Case    Procedure: Procedure(s):  EXCISION, PILONIDAL CYST       Diagnosis: Pilonidal cyst [L05.91]  Diagnosis Additional Information: No value filed.    Anesthesia Type:   General     Note:    Oropharynx: oropharynx clear of all foreign objects and spontaneously breathing  Level of Consciousness: drowsy  Oxygen Supplementation: face mask  Level of Supplemental Oxygen (L/min / FiO2): 6  Independent Airway: airway patency satisfactory and stable  Dentition: dentition unchanged  Vital Signs Stable: post-procedure vital signs reviewed and stable  Report to RN Given: handoff report given  Patient transferred to: PACU    Handoff Report: Identifed the Patient, Identified the Reponsible Provider, Reviewed the pertinent medical history, Discussed the surgical course, Reviewed Intra-OP anesthesia mangement and issues during anesthesia, Set expectations for post-procedure period and Allowed opportunity for questions and acknowledgement of understanding      Vitals:  Vitals Value Taken Time   /64 10/06/21 0845   Temp     Pulse 69 10/06/21 0847   Resp 17 10/06/21 0847   SpO2 100 % 10/06/21 0847   Vitals shown include unvalidated device data.    Electronically Signed By: SID Caceres CRNA  October 6, 2021  8:47 AM

## 2021-10-06 NOTE — DISCHARGE INSTRUCTIONS
[facility] Same-Day Surgery   Adult Discharge Orders & Instructions     For 24 hours after surgery    1. Get plenty of rest.  A responsible adult must stay with you for at least 24 hours after you leave the hospital.   2. Do not drive or use heavy equipment.  If you have weakness or tingling, don't drive or use heavy equipment until this feeling goes away.  3. Do not drink alcohol.  4. Avoid strenuous or risky activities.  Ask for help when climbing stairs.   5. You may feel lightheaded.  If so, sit for a few minutes before standing.  Have someone help you get up.   6. You may have a slight fever. Call the doctor if your fever is over 100 F (37.7 C) (taken under the tongue) or lasts longer than 24 hours.  7. You may have a dry mouth, a sore throat, muscle aches or trouble sleeping.  These should go away after 24 hours.  8. Do not make important or legal decisions.  We don t expect you to have any problems from the surgery or treatment you had today. Just in case, here s what to do if you have pain, upset stomach (nausea), bleeding or infection:  Pain:  Take medicines your doctor has prescribed or over-the-counter medicine they have suggested. Resting and using ice packs can help, too. For surgery on an arm or leg, raise it on a pillow to ease swelling. Call your doctor if these methods don t work.  Copyright [employer], Licensed under [employer]  Upset stomach (nausea):  Take anti-nausea medicine approved by your doctor. Drink clear liquids like apple juice, ginger ale, broth or 7-Up. Be sure to drink enough fluids. Rest can help, too. Move to normal foods when you re ready. Bleeding:  In the first 24 hours, you may see a little blood on your dressing, about the size of a quarter. You don t need to worry about this much blood, but if the blood spot keeps getting bigger:    Put pressure on the wound if you can, AND    Call your doctor.  Copyright [email or web address], Licensed under CC4.0  International  Fever/Infection: Please call your doctor if you have any of these signs:    Redness    Swelling    Wound feels warm    Pain gets worse    Bad-smelling fluid leaks from wound    Fever or chills  Call your doctor for any of the followin.  It has been over 8 to 10 hours since surgery and you are still not able to urinate (pass water).    2.  Headache for over 24 hours.    3.  Numbness, tingling or weakness in your legs the day after surgery (if you had spinal anesthesia).    Specialty Clinic: (daytime hours) [phone]  Nurse advice line: [phone]

## 2021-10-06 NOTE — BRIEF OP NOTE
[facility]    Brief Operative Note    Pre-operative diagnosis: Pilonidal cyst [L05.91]  Post-operative diagnosis Same as pre-operative diagnosis    Procedure: Procedure(s):  EXCISION, PILONIDAL CYST  Surgeon: Surgeon(s) and Role:     * Terence Nguyen MD - Primary  Anesthesia: General   Estimated Blood Loss: Less than 10 ml    Drains: None  Specimens:   ID Type Source Tests Collected by Time Destination   1 : pilonidal cyst Tissue Pilonidal Cyst SURGICAL PATHOLOGY EXAM Terence Nguyen MD 10/6/2021  8:07 AM      Findings:   Pilonidal cyst with tract.  Complications: None.  Implants: * No implants in log *      Terence Nguyen MD, FACS    #[id number]

## 2021-10-07 LAB
PATH REPORT.COMMENTS IMP SPEC: NORMAL
PATH REPORT.COMMENTS IMP SPEC: NORMAL
PATH REPORT.FINAL DX SPEC: NORMAL
PATH REPORT.GROSS SPEC: NORMAL
PATH REPORT.MICROSCOPIC SPEC OTHER STN: NORMAL
PATH REPORT.RELEVANT HX SPEC: NORMAL
PHOTO IMAGE: NORMAL

## 2021-10-07 PROCEDURE — 88304 TISSUE EXAM BY PATHOLOGIST: CPT | Mod: 26 | Performed by: PATHOLOGY

## 2021-10-18 ENCOUNTER — OFFICE VISIT (OUTPATIENT)
Dept: SURGERY | Facility: CLINIC | Age: 21
End: 2021-10-18
Payer: COMMERCIAL

## 2021-10-18 VITALS
SYSTOLIC BLOOD PRESSURE: 110 MMHG | WEIGHT: 180.2 LBS | BODY MASS INDEX: 25.8 KG/M2 | DIASTOLIC BLOOD PRESSURE: 64 MMHG | HEIGHT: 70 IN | TEMPERATURE: 97 F

## 2021-10-18 DIAGNOSIS — Z98.890 POST-OPERATIVE STATE: Primary | ICD-10-CM

## 2021-10-18 PROCEDURE — 99024 POSTOP FOLLOW-UP VISIT: CPT | Performed by: SPECIALIST

## 2021-10-18 ASSESSMENT — MIFFLIN-ST. JEOR: SCORE: 1823.63

## 2021-10-18 ASSESSMENT — PAIN SCALES - GENERAL: PAINLEVEL: MILD PAIN (3)

## 2021-10-18 NOTE — PROGRESS NOTES
Follow-up for excision of pilonidal cyst    Subjective:  Patient feels okay.  Just feels sore when he sits for long periods of time.      Objective:  B/P: 110/64, T: 97, P: Data Unavailable, R: Data Unavailable  Back: Incision healing well.  Sutures removed.      Path:  Skin, pilonidal cyst: Excision:  - Benign skin and underlying soft tissue with abscess cavity, granulation tissue, and foreign body type giant cell reaction with hair shafts, consistent with pilonidal cyst      Assessment/plan:  Patient is status post excision of a pilonidal cyst.  Doing well.  He will gradual increase his activity as tolerated follow-up with me as necessary.    Terence Nguyen MD, FACS

## 2021-10-18 NOTE — LETTER
10/18/2021         RE: Cruzito Rodas  [address] [place] [id number]        Dear Colleague,    Thank you for referring your patient, Cruzito Rodas, to the [facility]. Please see a copy of my visit note below.    Follow-up for excision of pilonidal cyst    Subjective:  Patient feels okay.  Just feels sore when he sits for long periods of time.      Objective:  B/P: 110/64, T: 97, P: Data Unavailable, R: Data Unavailable  Back: Incision healing well.  Sutures removed.      Path:  Skin, pilonidal cyst: Excision:  - Benign skin and underlying soft tissue with abscess cavity, granulation tissue, and foreign body type giant cell reaction with hair shafts, consistent with pilonidal cyst      Assessment/plan:  Patient is status post excision of a pilonidal cyst.  Doing well.  He will gradual increase his activity as tolerated follow-up with me as necessary.    Terence Nguyen MD, FACS        Again, thank you for allowing me to participate in the care of your patient.        Sincerely,        Terence Nguyen MD

## 2021-10-18 NOTE — LETTER
[address] [address]  Phone: [phone]    October 18, 2021        Cruzito GOMEZ Case  [address] [facility] [id number]          To whom it may concern:    RE: Cruzito GOMEZ Case    Patient was seen and treated today at our clinic.  Patient may return to work October 26th 2021 with the following:  No restrictions    Please contact me for questions or concerns.      Sincerely,        Terence Nguyen MD

## 2021-10-22 ENCOUNTER — NURSE TRIAGE (OUTPATIENT)
Dept: NURSING | Facility: CLINIC | Age: 21
End: 2021-10-22

## 2021-10-23 ENCOUNTER — HEALTH MAINTENANCE LETTER (OUTPATIENT)
Age: 21
End: 2021-10-23

## 2021-10-23 NOTE — TELEPHONE ENCOUNTER
"Patient states he had surgery 2 weeks ago- Excision of pilonidal cyst. He had his stitches removed on Monday- 10/18. He noticed a spot of blood on the toilet paper tonight and is wondering if he needs to be concerned. He states he did strain a little with a bowel movement, and had been sitting in the car for awhile placing pressure on his bottom. He also lifted something that was 15 pounds.     RN advised per discharge paperwork- \"Contact your Surgeon Team if you are concerned about large amount of bleeding, blood clots\"    Advised patient the spot could be due to pressure from sitting, or straining while having a bowel movement. Advised to monitor for now and call back if the bleeding happens again and if it increases in amount such as the toilet bowl water turns red, or if he notices blood clots.     Patient stated understanding.     Maricruz Mclean RN/[facility]    Additional Information    Negative: [1] Caller is not with the adult (patient) AND [2] reporting urgent symptoms    Negative: Lab result questions    Negative: Medication questions    Negative: Caller can't be reached by phone    Negative: Caller has already spoken to PCP or another triager    Negative: RN needs further essential information from caller in order to complete triage    Negative: Requesting regular office appointment    Negative: [1] Caller requesting NON-URGENT health information AND [2] PCP's office is the best resource    Health Information question, no triage required and triager able to answer question    Protocols used: INFORMATION ONLY CALL-A-AH      "

## 2022-01-03 ENCOUNTER — TELEPHONE (OUTPATIENT)
Dept: FAMILY MEDICINE | Facility: CLINIC | Age: 22
End: 2022-01-03
Payer: COMMERCIAL

## 2022-01-03 ENCOUNTER — OFFICE VISIT (OUTPATIENT)
Dept: FAMILY MEDICINE | Facility: CLINIC | Age: 22
End: 2022-01-03
Payer: COMMERCIAL

## 2022-01-03 DIAGNOSIS — J02.0 STREPTOCOCCAL SORE THROAT: Primary | ICD-10-CM

## 2022-01-03 DIAGNOSIS — R05.9 COUGH: ICD-10-CM

## 2022-01-03 DIAGNOSIS — J10.1 INFLUENZA A: ICD-10-CM

## 2022-01-03 LAB
FLUAV AG SPEC QL IA: POSITIVE
FLUBV AG SPEC QL IA: NEGATIVE
SARS-COV-2 RNA RESP QL NAA+PROBE: NEGATIVE

## 2022-01-03 PROCEDURE — 99207 PR NO CHARGE NURSE ONLY: CPT

## 2022-01-03 PROCEDURE — U0005 INFEC AGEN DETEC AMPLI PROBE: HCPCS

## 2022-01-03 PROCEDURE — U0003 INFECTIOUS AGENT DETECTION BY NUCLEIC ACID (DNA OR RNA); SEVERE ACUTE RESPIRATORY SYNDROME CORONAVIRUS 2 (SARS-COV-2) (CORONAVIRUS DISEASE [COVID-19]), AMPLIFIED PROBE TECHNIQUE, MAKING USE OF HIGH THROUGHPUT TECHNOLOGIES AS DESCRIBED BY CMS-2020-01-R: HCPCS

## 2022-01-03 PROCEDURE — 87804 INFLUENZA ASSAY W/OPTIC: CPT

## 2022-01-03 RX ORDER — OSELTAMIVIR PHOSPHATE 75 MG/1
75 CAPSULE ORAL 2 TIMES DAILY
Qty: 10 CAPSULE | Refills: 0 | Status: SHIPPED | OUTPATIENT
Start: 2022-01-03 | End: 2022-01-08

## 2022-01-03 NOTE — TELEPHONE ENCOUNTER
Patient positive for Influenza A    VORB from  Dr. Cote for Tamiflu 75 mg BID for 5 days.    Mona Akbar RN

## 2022-01-28 ENCOUNTER — OFFICE VISIT (OUTPATIENT)
Dept: FAMILY MEDICINE | Facility: CLINIC | Age: 22
End: 2022-01-28
Payer: COMMERCIAL

## 2022-01-28 VITALS
TEMPERATURE: 98.2 F | HEART RATE: 100 BPM | RESPIRATION RATE: 14 BRPM | SYSTOLIC BLOOD PRESSURE: 108 MMHG | BODY MASS INDEX: 25.34 KG/M2 | DIASTOLIC BLOOD PRESSURE: 70 MMHG | WEIGHT: 176.6 LBS

## 2022-01-28 DIAGNOSIS — G44.209 TENSION HEADACHE: Primary | ICD-10-CM

## 2022-01-28 PROCEDURE — 99214 OFFICE O/P EST MOD 30 MIN: CPT | Performed by: FAMILY MEDICINE

## 2022-01-28 NOTE — PROGRESS NOTES
"  Assessment & Plan     ASSESSMENT/ORDERS:    ICD-10-CM    1. Tension headache  G44.209      PLAN:  1.  Discussed nature of his headaches are likely tension related headaches.  Not likely to be migraine.  Since headaches are improving, physical therapy or chiropractic care not needed at this time.  Discussed stress reduction and proper posture.  If he continues to have issues, he can contact me for physical therapy referral to work on his tension headaches more.         30 minutes spent on the date of the encounter doing chart review, patient visit and documentation        BMI:   Estimated body mass index is 25.34 kg/m  as calculated from the following:    Height as of 10/18/21: 1.778 m (5' 10\").    Weight as of this encounter: 80.1 kg (176 lb 9.6 oz).           No follow-ups on file.    Jese Roa MD  [facility] JOSUE Silverio is a 21 year old who presents for the following health issues     HPI     Concern - headaches  Onset: last saturday  Description: started out with eyes hurting when he moved it and then head pain on and off  Intensity: mild  Progression of Symptoms:  improving  Accompanying Signs & Symptoms: breath odor  Previous history of similar problem: none  Precipitating factors:        Worsened by: none  Alleviating factors:        Improved by: none  Therapies tried and outcome: ibuprofen - helped      Was up for a long time the night before the headaches started, had contacts that have been outdated for 2-3 weeks after their 30 days were up.      Went to a friend's house after he worked a shift at work and stayed up very late and longer than he planned.  Was up for about 30 hours straight.  headaches started and he crashed and slept a bunch.      He feels that his mouth is dirty and breath is bad when he is at work.      Has been stressing a lot lately due to work and living situation.  Stuck in a lease in an apartment that is expensive and cannot get out of " it.  Works overnights at [employer] and does not like it.  Gets paid well, but is not in the department he wants to be in.      Still at this moment he can feel lightly where the headache is.      Ibuprofen was helpful.      Review of Systems         Objective    /70 (BP Location: Right arm)   Pulse 100   Temp 98.2  F (36.8  C) (Temporal)   Resp 14   Wt 80.1 kg (176 lb 9.6 oz)   BMI 25.34 kg/m    Body mass index is 25.34 kg/m .  Physical Exam  Constitutional:       General: He is not in acute distress.     Appearance: He is well-developed.   HENT:      Head: Normocephalic and atraumatic.      Right Ear: Hearing, tympanic membrane, ear canal and external ear normal.      Left Ear: Hearing, tympanic membrane, ear canal and external ear normal.      Nose: Nose normal.      Mouth/Throat:      Mouth: No oral lesions.      Tongue: No lesions. Tongue does not deviate from midline.      Palate: No mass and lesions.      Pharynx: Uvula midline. No oropharyngeal exudate.   Eyes:      General: Lids are normal. No scleral icterus.        Right eye: No discharge.         Left eye: No discharge.      Extraocular Movements: Extraocular movements intact.      Conjunctiva/sclera: Conjunctivae normal.      Pupils: Pupils are equal, round, and reactive to light.   Neck:      Thyroid: No thyroid mass or thyromegaly.      Trachea: No tracheal deviation.   Cardiovascular:      Rate and Rhythm: Normal rate and regular rhythm.      Pulses: Normal pulses.      Heart sounds: Normal heart sounds, S1 normal and S2 normal. No murmur heard.  No S3 or S4 sounds.    Pulmonary:      Effort: Pulmonary effort is normal. No respiratory distress.      Breath sounds: Normal breath sounds. No wheezing or rales.   Chest:   Breasts:      Right: No supraclavicular adenopathy.      Left: No supraclavicular adenopathy.       Musculoskeletal:         General: No deformity. Normal range of motion.      Cervical back: Normal range of motion and neck  supple. No pain with movement or spinous process tenderness.      Comments: Neck has area of tenderness to palpation along the trapezius muscles and then inserting to the occiput.   Lymphadenopathy:      Cervical: No cervical adenopathy.      Upper Body:      Right upper body: No supraclavicular adenopathy.      Left upper body: No supraclavicular adenopathy.   Skin:     General: Skin is warm and dry.   Neurological:      Mental Status: He is alert and oriented to person, place, and time.      Cranial Nerves: Cranial nerves are intact.      Sensory: Sensation is intact.      Motor: Motor function is intact. No abnormal muscle tone.      Coordination: Coordination is intact.      Gait: Gait is intact.      Deep Tendon Reflexes: Reflexes are normal and symmetric.   Psychiatric:         Speech: Speech normal.         Thought Content: Thought content normal.         Judgment: Judgment normal.

## 2022-02-12 ENCOUNTER — HEALTH MAINTENANCE LETTER (OUTPATIENT)
Age: 22
End: 2022-02-12

## 2022-03-27 ENCOUNTER — HOSPITAL ENCOUNTER (EMERGENCY)
Facility: CLINIC | Age: 22
Discharge: HOME | End: 2022-03-27
Attending: EMERGENCY MEDICINE | Admitting: EMERGENCY MEDICINE
Payer: OTHER MISCELLANEOUS

## 2022-03-27 VITALS
DIASTOLIC BLOOD PRESSURE: 69 MMHG | WEIGHT: 185.1 LBS | TEMPERATURE: 97.8 F | BODY MASS INDEX: 26.56 KG/M2 | RESPIRATION RATE: 16 BRPM | OXYGEN SATURATION: 99 % | HEART RATE: 107 BPM | SYSTOLIC BLOOD PRESSURE: 152 MMHG

## 2022-03-27 DIAGNOSIS — S91.209A AVULSION OF TOENAIL, INITIAL ENCOUNTER: ICD-10-CM

## 2022-03-27 PROCEDURE — 11730 AVULSION NAIL PLATE SIMPLE 1: CPT | Mod: LT | Performed by: EMERGENCY MEDICINE

## 2022-03-27 PROCEDURE — 250N000009 HC RX 250

## 2022-03-27 PROCEDURE — 99284 EMERGENCY DEPT VISIT MOD MDM: CPT | Mod: 25 | Performed by: EMERGENCY MEDICINE

## 2022-03-27 PROCEDURE — 99283 EMERGENCY DEPT VISIT LOW MDM: CPT | Mod: 25 | Performed by: EMERGENCY MEDICINE

## 2022-03-27 RX ORDER — CEPHALEXIN 500 MG/1
CAPSULE ORAL
Qty: 40 CAPSULE | Refills: 0 | Status: SHIPPED | OUTPATIENT
Start: 2022-03-27 | End: 2024-02-14

## 2022-03-27 NOTE — LETTER
March 27, 2022      To Whom It May Concern:      Cruzito GOMEZ Case was seen in our Emergency Department today, 03/27/22.  I expect his condition to improve over the next 7 days.  He may return to work but should rest his left toe until healed. Should keep it clean and dry.     Sincerely,        Sergio Quijano MD

## 2022-03-27 NOTE — ED PROVIDER NOTES
History     Chief Complaint   Patient presents with     Toe Injury     HPI  Cruzito GOMEZ Case is a 21 year old male who presents to the emergency department secondary to a left great toenail issue.  He dropped a shelf on his toe a month ago and now the nail seems to be .  There is some redness and swelling around the nail.  He does not think he broke it because he has been walking on it and has not been that uncomfortable.  He can move the toe back-and-forth.  There is some yellow discoloration to the base of the nail that he is concerned about.  No fever chills nausea vomiting diarrhea or other new symptoms.    Allergies:  No Known Allergies    Problem List:    Patient Active Problem List    Diagnosis Date Noted     Acne 08/15/2012     Priority: Medium        Past Medical History:    No past medical history on file.    Past Surgical History:    Past Surgical History:   Procedure Laterality Date     CYSTECTOMY PILONIDAL N/A 10/6/2021    Procedure: EXCISION, PILONIDAL CYST;  Surgeon: Terence Nguyen MD;  Location: [facility] OR       Family History:    Family History   Problem Relation Age of Onset     Diabetes No family hx of      Coronary Artery Disease No family hx of      Hypertension No family hx of      Hyperlipidemia No family hx of      Cerebrovascular Disease No family hx of      Breast Cancer No family hx of      Colon Cancer No family hx of      Prostate Cancer No family hx of      Other Cancer No family hx of        Social History:  Marital Status:  Single [1]  Social History     Tobacco Use     Smoking status: Never Smoker     Smokeless tobacco: Never Used   Vaping Use     Vaping Use: Every day     Substances: Nicotine     Devices: Refillable tank   Substance Use Topics     Alcohol use: Yes     Comment: occasional     Drug use: No        Medications:    cephALEXin (KEFLEX) 500 MG capsule          Review of Systems   All other systems reviewed and are negative.      Physical Exam   BP: (!)  152/69  Pulse: 107  Temp: 97.8  F (36.6  C)  Resp: 16  Weight: 84 kg (185 lb 1.6 oz)  SpO2: 99 %      Physical Exam  Vitals and nursing note reviewed.   Constitutional:       General: He is not in acute distress.     Appearance: He is well-developed. He is not diaphoretic.   HENT:      Head: Normocephalic and atraumatic.      Nose: Nose normal. No congestion.      Mouth/Throat:      Mouth: Mucous membranes are moist.   Eyes:      General: No scleral icterus.        Right eye: No discharge.         Left eye: No discharge.      Extraocular Movements: Extraocular movements intact.      Conjunctiva/sclera: Conjunctivae normal.   Cardiovascular:      Rate and Rhythm: Normal rate.   Pulmonary:      Effort: Pulmonary effort is normal.   Musculoskeletal:         General: Normal range of motion.      Cervical back: Normal range of motion and neck supple.      Comments: His left great toenail is not attached at the base in the middle but is attached along the edges.  It appears to be coming off and being replaced by a new nail.  There is some yellow discoloration to the nail at the base.  There is no tenderness over the nail bed.  There is very mild redness of the tissue around the nail.  It is mildly tender to palpation.  There is no significant toe swelling.   Skin:     General: Skin is warm and dry.      Findings: No rash.   Neurological:      Mental Status: He is alert and oriented to person, place, and time.   Psychiatric:         Mood and Affect: Mood normal.         Thought Content: Thought content normal.         ED Course                 Procedures         After risk-benefit discussion I recommended removal of the nail because it is not attached at the base anyway and there appears to be a new nail coming in and the patient agreed to proceed.  1% lidocaine was used for digital block.  Turnicot was placed.  Nail was gently removed using a needle .  Very little pressure had to be applied in order to remove the  nail.  A new nail is growing in without underlying pustular collection or hematoma.  There is not any significant swelling or tenderness.  There is some new granulation tissue that has formed and with manipulation of that a small amount of bleeding occurred but it stopped spontaneously.  Patient tolerated the procedure well.  Turnicot was removed       No results found for this or any previous visit (from the past 24 hour(s)).    Medications - No data to display    Assessments & Plan (with Medical Decision Making)  21-year-old with a nail injury.  New nail seems to be coming in.  I do not see signs of pus underneath the nail.  Trephination not performed.  Nail was removed as above.  Because of some swelling and some discoloration I decided to treat him with Keflex.  Return to ER precautions and fall precautions discussed.  The wound was dressed with antibiotic ointment nonstick bandage and gauze.  Wound care and follow-up were discussed.     I have reviewed the nursing notes.    I have reviewed the findings, diagnosis, plan and need for follow up with the patient.      Discharge Medication List as of 3/27/2022  6:03 PM      START taking these medications    Details   cephALEXin (KEFLEX) 500 MG capsule Take 1 capsule (500 mg) by mouth 2 times daily, Disp-40 capsule, R-0, [employer]             Final diagnoses:   Avulsion of toenail, initial encounter       3/27/2022   [facility] EMERGENCY DEPT     Sergio Quijano MD  03/27/22 [id number]

## 2022-03-27 NOTE — DISCHARGE INSTRUCTIONS
-keep nail clean and dry  -leave bandage on for two days then change.    -after that put antibiotic ointment and bandage on until healing  -the nail will take time to come back in  -if increased swelling or pain return to er.

## 2022-08-16 ENCOUNTER — VIRTUAL VISIT (OUTPATIENT)
Dept: BEHAVIORAL HEALTH | Facility: CLINIC | Age: 22
End: 2022-08-16
Payer: COMMERCIAL

## 2022-08-16 DIAGNOSIS — F43.21 ADJUSTMENT DISORDER WITH DEPRESSED MOOD: Primary | ICD-10-CM

## 2022-08-16 PROCEDURE — 90834 PSYTX W PT 45 MINUTES: CPT | Mod: 95 | Performed by: MARRIAGE & FAMILY THERAPIST

## 2022-08-16 ASSESSMENT — ANXIETY QUESTIONNAIRES
GAD7 TOTAL SCORE: 6
2. NOT BEING ABLE TO STOP OR CONTROL WORRYING: SEVERAL DAYS
6. BECOMING EASILY ANNOYED OR IRRITABLE: SEVERAL DAYS
3. WORRYING TOO MUCH ABOUT DIFFERENT THINGS: SEVERAL DAYS
1. FEELING NERVOUS, ANXIOUS, OR ON EDGE: SEVERAL DAYS
7. FEELING AFRAID AS IF SOMETHING AWFUL MIGHT HAPPEN: SEVERAL DAYS
IF YOU CHECKED OFF ANY PROBLEMS ON THIS QUESTIONNAIRE, HOW DIFFICULT HAVE THESE PROBLEMS MADE IT FOR YOU TO DO YOUR WORK, TAKE CARE OF THINGS AT HOME, OR GET ALONG WITH OTHER PEOPLE: NOT DIFFICULT AT ALL
7. FEELING AFRAID AS IF SOMETHING AWFUL MIGHT HAPPEN: SEVERAL DAYS
GAD7 TOTAL SCORE: 6
5. BEING SO RESTLESS THAT IT IS HARD TO SIT STILL: NOT AT ALL
4. TROUBLE RELAXING: SEVERAL DAYS
8. IF YOU CHECKED OFF ANY PROBLEMS, HOW DIFFICULT HAVE THESE MADE IT FOR YOU TO DO YOUR WORK, TAKE CARE OF THINGS AT HOME, OR GET ALONG WITH OTHER PEOPLE?: NOT DIFFICULT AT ALL

## 2022-08-16 ASSESSMENT — COLUMBIA-SUICIDE SEVERITY RATING SCALE - C-SSRS
1. HAVE YOU WISHED YOU WERE DEAD OR WISHED YOU COULD GO TO SLEEP AND NOT WAKE UP?: YES
TOTAL  NUMBER OF INTERRUPTED ATTEMPTS LIFETIME: NO
1. IN THE PAST MONTH, HAVE YOU WISHED YOU WERE DEAD OR WISHED YOU COULD GO TO SLEEP AND NOT WAKE UP?: YES
REASONS FOR IDEATION PAST MONTH: MOSTLY TO END OR STOP THE PAIN (YOU COULDN'T GO ON LIVING WITH THE PAIN OR HOW YOU WERE FEELING)
2. HAVE YOU ACTUALLY HAD ANY THOUGHTS OF KILLING YOURSELF?: NO
TOTAL  NUMBER OF ABORTED OR SELF INTERRUPTED ATTEMPTS LIFETIME: NO
REASONS FOR IDEATION LIFETIME: MOSTLY TO END OR STOP THE PAIN (YOU COULDN'T GO ON LIVING WITH THE PAIN OR HOW YOU WERE FEELING)
6. HAVE YOU EVER DONE ANYTHING, STARTED TO DO ANYTHING, OR PREPARED TO DO ANYTHING TO END YOUR LIFE?: NO
ATTEMPT LIFETIME: NO

## 2022-08-16 ASSESSMENT — PATIENT HEALTH QUESTIONNAIRE - PHQ9
SUM OF ALL RESPONSES TO PHQ QUESTIONS 1-9: 4
10. IF YOU CHECKED OFF ANY PROBLEMS, HOW DIFFICULT HAVE THESE PROBLEMS MADE IT FOR YOU TO DO YOUR WORK, TAKE CARE OF THINGS AT HOME, OR GET ALONG WITH OTHER PEOPLE: NOT DIFFICULT AT ALL
SUM OF ALL RESPONSES TO PHQ QUESTIONS 1-9: 4

## 2022-08-16 NOTE — PROGRESS NOTES
[facility] Primary Care: Integrated Behavioral Health  2022      Behavioral Health Clinician Progress Note    Patient Name: Cruzito Rodas           Service Type:  Individual      Service Location:   Face to Face in Home / Community via telemedicine     Session Start Time: 3:06pm  Session End Time: 3:57pm      Session Length: 38 - 52      Attendees: Patient     Service Modality:  Video Visit:      Provider verified identity through the following two step process.  Patient provided:  Patient  and Patient address    Telemedicine Visit: The patient's condition can be safely assessed and treated via synchronous audio and visual telemedicine encounter.      Reason for Telemedicine Visit: Patient convenience (e.g. access to timely appointments / distance to available provider)    Originating Site (Patient Location): Patient's other parked car    Distant Site (Provider Location): Provider Remote Setting- Home Office    Consent:  The patient/guardian has verbally consented to: the potential risks and benefits of telemedicine (video visit) versus in person care; bill my insurance or make self-payment for services provided; and responsibility for payment of non-covered services.     Patient would like the video invitation sent by:  My Chart    Mode of Communication:  Video Conference via [employer]    As the provider I attest to compliance with applicable laws and regulations related to telemedicine.    Visit Activities (Refresh list every visit): [facility] and [facility] Only    Diagnostic Assessment Date: not completed due to patient clinical needs  Treatment Plan Review Date: due after DA is complete  See Flowsheets for today's PHQ-9 and SHAHEED-7 results  Previous PHQ-9:   PHQ-9 SCORE 2020   PHQ-9 Total Score MyChart 15 (Moderately severe depression) - 4 (Minimal depression)   PHQ-9 Total Score 15 3 4     Previous SHAHEED-7:   SHAHEED-7 SCORE 2022   Total Score 6 (mild anxiety)  "  Total Score 6       TONE LEVEL:  No flowsheet data found.    DATA  Extended Session (60+ minutes): No  Interactive Complexity: No  Crisis: No  [facility] Patient: No    Treatment Objective(s) Addressed in This Session:  Target Behavior(s): depression    Depressed Mood: Increase interest, engagement, and pleasure in doing things  Decrease frequency and intensity of feeling down, depressed, hopeless    Current Stressors / Issues:  Patient reports that he was referred to this writer by his mom. He wasn't interested in counseling and said he would do it. He states that he has felt hopeless and he expressed stress and frustration to his parents and said he's been feeling this way for awhile.     He identified that the state of the economy, culture clashing and financial stress contribute to his personal distress. He states that things worsened when he was in a romantic relationship. He then ended the relationship and experienced more financial strain. He states that he has been living with his parents for the last month and he states that he has a hopeless outlook on the future.    Patient states that he doesn't want kids because they \"cost a lot of money\" and he's also concerned with the state of the world.    Patient attended one year of college and was majoring in theater and [employer]. He was going to school at [employer] at [employer]. He states that he didn't like the school and he stopped due to expense of college.    He finds that he has been feeling more sad as the summer is coming to an end. He states that this tends to happen about this time of year. He states that he doesn't look forward to work. He reflected that in May he tends to move slower.    His Grandma  a couple weeks ago. He denies feeling sad, stating that he focuses on other things. He reflected that he felt worse when his cat .    Patient identified that it's harder at night when he's trying to go to bed. This is because it's quiet. " Patient states that it takes a while to fall asleep. He has red lights in his room. He denies impacting his sleep. He states that he feels more lonely. Patient has been single for 9 months.    Patient identified his parents and best friend as support.    Reinforced patient being open to counseling. Discussed how he could benefit. Patient reflected that he felt better at the end of the visit and he was open to meeting again.     Progress on Treatment Objective(s) / Homework:  In development-first visit    Motivational Interviewing    MI Intervention: Expressed Empathy/Understanding, Supported Autonomy, Collaboration, Evocation and Open-ended questions     Change Talk Expressed by the Patient: NA - Precontemplative    Provider Response to Change Talk: E - Evoked more info from patient about behavior change, A - Affirmed patient's thoughts, decisions, or attempts at behavior change, R - Reflected patient's change talk and S - Summarized patient's change talk statements    Also provided psychoeducation about behavioral health condition, symptoms, and treatment options    Care Plan review completed: Yes    Medication Review:  No current psychiatric medications prescribed    Medication Compliance:  NA    Changes in Health Issues:   None reported    Chemical Use Review:   Substance Use: Chemical use reviewed, no active concerns identified  Patient reports alcohol use as very rarely, he denies use of cannabis and other illicit drugs. He states that for caffeine, he has a Social IQ (Social Influence Quotient) blast everyday.     Tobacco Use: Yes, that he uses an e-cig/vape which has nicotene.  Patient reports frequency of use daily use. Preparatory , he states that he wants to quit.    Assessment: Current Emotional / Mental Status (status of significant symptoms):  Risk status (Self / Other harm or suicidal ideation)  Patient has had a history of suicidal ideation: patient reports that starting 3 years ago he would have thoughts of wanting to  "\"pop out of existence\". He denied any specific thoughts of wanting to die or kill himself and denies a history of suicide attempts, self-injurious behavior, homicidal ideation, homicidal behavior and and other safety concerns  Patient denies current fears or concerns for personal safety.  Patient reports the following current or recent suicidal ideation or behaviors: occasional thoughts of wishing he would pop out of existence, he denies any specific thoughts about dying or of killing himself. He denies any suicidal plan or intent.  Patient denies current or recent homicidal ideation or behaviors.  Patient denies current or recent self injurious behavior or ideation.  Patient denies other safety concerns.  A safety and risk management plan has been developed including: talk to his mom or best friend, call 911 and present to the ER         Appearance:   Appropriate   Eye Contact:   Fair   Psychomotor Behavior: Normal   Attitude:   Cooperative  Pleasant  Orientation:   All  Speech   Rate / Production: Normal/ Responsive Monotone    Volume:  Normal   Mood:    Depressed  Irritable   Affect:    Flat   Thought Content:  Rumination   Thought Form:  Coherent  Circumstantial  Insight:    Fair     Diagnoses:  1. Adjustment disorder with depressed mood        Collateral Reports Completed:  CSSR-S    Plan: (Homework, other):  Patient was given information about behavioral services and encouraged to schedule a follow up appointment with the clinic [facility] in 3 weeks.  He was also given information about mental health symptoms and treatment options  and Cognitive Behavioral Therapy skills to practice when experiencing depression.  CD Recommendations: No indications of CD issues.     VICKY Guillermo, [facility]      "

## 2022-09-20 ENCOUNTER — VIRTUAL VISIT (OUTPATIENT)
Dept: BEHAVIORAL HEALTH | Facility: CLINIC | Age: 22
End: 2022-09-20
Payer: COMMERCIAL

## 2022-09-20 DIAGNOSIS — F43.21 ADJUSTMENT DISORDER WITH DEPRESSED MOOD: Primary | ICD-10-CM

## 2022-09-20 PROCEDURE — 90834 PSYTX W PT 45 MINUTES: CPT | Mod: 95 | Performed by: MARRIAGE & FAMILY THERAPIST

## 2022-09-20 NOTE — PROGRESS NOTES
[facility] Primary Care: Integrated Behavioral Health  September 20, 2022      Behavioral Health Clinician Progress Note    Patient Name: Cruzito Rodas           Service Type:  Individual      Service Location:   Face to Face in Home / Community via telemedicine     Session Start Time: 1:40pm  Session End Time: 2:28pm      Session Length: 38 - 52      Attendees: Patient     Service Modality:  Video Visit:      Provider verified identity through the following two step process.  Patient provided:  Patient is known previously to provider    Telemedicine Visit: The patient's condition can be safely assessed and treated via synchronous audio and visual telemedicine encounter.      Reason for Telemedicine Visit: Patient convenience (e.g. access to timely appointments / distance to available provider)    Originating Site (Patient Location): Patient's other parked car    Distant Site (Provider Location): Provider Remote Setting- Home Office    Consent:  The patient/guardian has verbally consented to: the potential risks and benefits of telemedicine (video visit) versus in person care; bill my insurance or make self-payment for services provided; and responsibility for payment of non-covered services.     Patient would like the video invitation sent by:  My Chart    Mode of Communication:  Video Conference via [employer]    As the provider I attest to compliance with applicable laws and regulations related to telemedicine.    Visit Activities (Refresh list every visit): [facility] Only    Diagnostic Assessment Date: not completed due to patient clinical needs  Treatment Plan Review Date: due after DA is complete  See Flowsheets for today's PHQ-9 and SHAHEED-7 results  Previous PHQ-9:   PHQ-9 SCORE 4/4/2018 11/24/2020 8/16/2022   PHQ-9 Total Score MyChart 15 (Moderately severe depression) - 4 (Minimal depression)   PHQ-9 Total Score 15 3 4     Previous SHAHEED-7:   SHAHEED-7 SCORE 8/16/2022   Total Score 6 (mild  "anxiety)   Total Score 6       TONE LEVEL:  No flowsheet data found.    DATA  Extended Session (60+ minutes): No  Interactive Complexity: No  Crisis: No  [facility] Patient: No    Treatment Objective(s) Addressed in This Session:  Target Behavior(s): depression    Depressed Mood: Increase interest, engagement, and pleasure in doing things  Decrease frequency and intensity of feeling down, depressed, hopeless    Current Stressors / Issues:  \"things could be better\" Patient expressed frustration due to gas prices increasing. Patient states that he is ready for the cooler weather.     Patient talked about having nothing to do after work. He states that he has \"no where to go or people to be with\". Patient reflected that he has been single for 10 months. [facility] explored how patient engages in social interaction. He typically does something socially about 3 times a week. He has also started dating. When he is not being social he has been playing a video game that is new to him.     [facility] explored how socializing has changed since the end of his relationship. Validating patient's feelings about the change of not having a partner to spend the majority of time with. Reinforced patient spending time doing things with others. Discussed mindset and how thought processes impact experience.    Progress on Treatment Objective(s) / Homework:  No improvement - PREPARATION (Decided to change - considering how); Intervened by negotiating a change plan and determining options / strategies for behavior change, identifying triggers, exploring social supports, and working towards setting a date to begin behavior change    Motivational Interviewing    MI Intervention: Expressed Empathy/Understanding, Supported Autonomy, Collaboration, Evocation and Open-ended questions     Change Talk Expressed by the Patient: NA - Precontemplative    Provider Response to Change Talk: E - Evoked more info from patient about behavior change, A - Affirmed patient's " "thoughts, decisions, or attempts at behavior change, R - Reflected patient's change talk and S - Summarized patient's change talk statements    Also provided psychoeducation about behavioral health condition, symptoms, and treatment options    Care Plan review completed: No    Medication Review:  No current psychiatric medications prescribed    Medication Compliance:  NA    Changes in Health Issues:   None reported    Chemical Use Review:   Substance Use: Chemical use reviewed, no active concerns identified       Tobacco Use: No change in amount of tobacco use since last session.  Contemplation     Assessment: Current Emotional / Mental Status (status of significant symptoms):  Risk status (Self / Other harm or suicidal ideation)  Patient has had a history of suicidal ideation: patient reports that starting 3 years ago he would have thoughts of wanting to \"pop out of existence\". He denied any specific thoughts of wanting to die or kill himself and denies a history of suicide attempts, self-injurious behavior, homicidal ideation, homicidal behavior and and other safety concerns  Patient denies current fears or concerns for personal safety.  Patient denies current or recent suicidal ideation or behaviors.  Patient denies current or recent homicidal ideation or behaviors.  Patient denies current or recent self injurious behavior or ideation.  Patient denies other safety concerns.  A safety and risk management plan has been developed including: talk to his mom or best friend, call 911 and present to the ER         Appearance:   Appropriate   Eye Contact:   Fair   Psychomotor Behavior: Normal   Attitude:   Cooperative  Pleasant  Orientation:   All  Speech   Rate / Production: Normal/ Responsive Monotone    Volume:  Normal   Mood:    Depressed  Irritable   Affect:    Flat   Thought Content:  Rumination   Thought Form:  Coherent  Circumstantial  Insight:    Fair     Diagnoses:  1. Adjustment disorder with depressed mood  "       Collateral Reports Completed:  Not Applicable    Plan: (Homework, other):  Patient was given information about behavioral services and encouraged to schedule a follow up appointment with the clinic [facility] in 3 weeks.  He was also given information about mental health symptoms and treatment options  and Cognitive Behavioral Therapy skills to practice when experiencing depression.  CD Recommendations: No indications of CD issues.     VICKY Guillermo, [facility]

## 2022-10-09 ENCOUNTER — HEALTH MAINTENANCE LETTER (OUTPATIENT)
Age: 22
End: 2022-10-09

## 2023-03-25 ENCOUNTER — HEALTH MAINTENANCE LETTER (OUTPATIENT)
Age: 23
End: 2023-03-25

## 2023-12-18 ENCOUNTER — VIRTUAL VISIT (OUTPATIENT)
Dept: BEHAVIORAL HEALTH | Facility: CLINIC | Age: 23
End: 2023-12-18
Payer: COMMERCIAL

## 2023-12-18 DIAGNOSIS — F43.21 ADJUSTMENT DISORDER WITH DEPRESSED MOOD: Primary | ICD-10-CM

## 2023-12-18 PROCEDURE — 90834 PSYTX W PT 45 MINUTES: CPT | Mod: VID | Performed by: MARRIAGE & FAMILY THERAPIST

## 2023-12-18 ASSESSMENT — COLUMBIA-SUICIDE SEVERITY RATING SCALE - C-SSRS
ATTEMPT LIFETIME: NO
TOTAL  NUMBER OF INTERRUPTED ATTEMPTS LIFETIME: NO
2. HAVE YOU ACTUALLY HAD ANY THOUGHTS OF KILLING YOURSELF?: NO
1. HAVE YOU WISHED YOU WERE DEAD OR WISHED YOU COULD GO TO SLEEP AND NOT WAKE UP?: YES
REASONS FOR IDEATION LIFETIME: MOSTLY TO END OR STOP THE PAIN (YOU COULDN'T GO ON LIVING WITH THE PAIN OR HOW YOU WERE FEELING)
TOTAL  NUMBER OF ABORTED OR SELF INTERRUPTED ATTEMPTS LIFETIME: NO
REASONS FOR IDEATION PAST MONTH: DOES NOT APPLY
1. IN THE PAST MONTH, HAVE YOU WISHED YOU WERE DEAD OR WISHED YOU COULD GO TO SLEEP AND NOT WAKE UP?: NO
6. HAVE YOU EVER DONE ANYTHING, STARTED TO DO ANYTHING, OR PREPARED TO DO ANYTHING TO END YOUR LIFE?: NO

## 2023-12-18 ASSESSMENT — ANXIETY QUESTIONNAIRES
2. NOT BEING ABLE TO STOP OR CONTROL WORRYING: MORE THAN HALF THE DAYS
7. FEELING AFRAID AS IF SOMETHING AWFUL MIGHT HAPPEN: SEVERAL DAYS
6. BECOMING EASILY ANNOYED OR IRRITABLE: MORE THAN HALF THE DAYS
GAD7 TOTAL SCORE: 9
1. FEELING NERVOUS, ANXIOUS, OR ON EDGE: SEVERAL DAYS
3. WORRYING TOO MUCH ABOUT DIFFERENT THINGS: MORE THAN HALF THE DAYS
IF YOU CHECKED OFF ANY PROBLEMS ON THIS QUESTIONNAIRE, HOW DIFFICULT HAVE THESE PROBLEMS MADE IT FOR YOU TO DO YOUR WORK, TAKE CARE OF THINGS AT HOME, OR GET ALONG WITH OTHER PEOPLE: SOMEWHAT DIFFICULT
4. TROUBLE RELAXING: SEVERAL DAYS
5. BEING SO RESTLESS THAT IT IS HARD TO SIT STILL: NOT AT ALL
GAD7 TOTAL SCORE: 9

## 2023-12-18 ASSESSMENT — PATIENT HEALTH QUESTIONNAIRE - PHQ9
SUM OF ALL RESPONSES TO PHQ QUESTIONS 1-9: 10
SUM OF ALL RESPONSES TO PHQ QUESTIONS 1-9: 10
10. IF YOU CHECKED OFF ANY PROBLEMS, HOW DIFFICULT HAVE THESE PROBLEMS MADE IT FOR YOU TO DO YOUR WORK, TAKE CARE OF THINGS AT HOME, OR GET ALONG WITH OTHER PEOPLE: SOMEWHAT DIFFICULT

## 2023-12-18 NOTE — PROGRESS NOTES
[facility] Primary Care: Integrated Behavioral Health  December 18, 2023      Behavioral Health Clinician Progress Note    Patient Name: Cruzito Rodas           Service Type:  Individual      Service Location:   Face to Face in Home / Community via telemedicine     Session Start Time: 3:03pm  Session End Time: 3:53pm      Session Length: 38 - 52      Attendees: Patient     Service Modality:  Video Visit:      Provider verified identity through the following two step process.  Patient provided:  Patient is known previously to provider    Telemedicine Visit: The patient's condition can be safely assessed and treated via synchronous audio and visual telemedicine encounter.      Reason for Telemedicine Visit: Patient convenience (e.g. access to timely appointments / distance to available provider)    Originating Site (Patient Location): Patient's other parked car    Distant Site (Provider Location): Provider Remote Setting- Home Office    Consent:  The patient/guardian has verbally consented to: the potential risks and benefits of telemedicine (video visit) versus in person care; bill my insurance or make self-payment for services provided; and responsibility for payment of non-covered services.     Patient would like the video invitation sent by:  My Chart    Mode of Communication:  Video Conference via [employer]    As the provider I attest to compliance with applicable laws and regulations related to telemedicine.    Visit Activities (Refresh list every visit): [facility] Only    Diagnostic Assessment Date: not completed due to patient clinical needs  Treatment Plan Review Date: due after DA is complete  See Flowsheets for today's PHQ-9 and SHAHEED-7 results  Previous PHQ-9:       11/24/2020     8:53 AM 8/16/2022     2:33 PM 12/18/2023     2:33 PM   PHQ-9 SCORE   PHQ-9 Total Score MyChart  4 (Minimal depression) 10 (Moderate depression)   PHQ-9 Total Score 3 4 10     Previous SHAHEED-7:       8/16/2022      "2:32 PM 12/18/2023     2:34 PM   SHAHEED-7 SCORE   Total Score 6 (mild anxiety) 9 (mild anxiety)   Total Score 6 9       TONE LEVEL:       No data to display                DATA  Extended Session (60+ minutes): No  Interactive Complexity: No  Crisis: No  [facility] Patient: No    Treatment Objective(s) Addressed in This Session:  Target Behavior(s):  depression    Depressed Mood: Increase interest, engagement, and pleasure in doing things  Decrease frequency and intensity of feeling down, depressed, hopeless    Current Stressors / Issues:  Patient reports that he's not doing well. He states that he's interested in meeting in person. He reports that things changed about three years ago. He states that is when his relationship ended, his apartment lease ended and he moved back home with his parents. He reports that in the last few months, he finds that he \"doesn't care\" as much about things. He provided the example of playing video games and not feeling interested in doing this and feels as though he has to \"force\" himself to do it. When he does this, he reflected that he is fine and enjoys this. He identified that he is bothered that he's not excited about his hobbies.     He identified that he's feeling lonely now. He is currently living in an apartment with his friend. His roommate will frequently be gone at his girlfriend's house, so patient will be by himself for a lot of the time. He notices that the weather impacts his mood as well. He finds himself feeling more motivated when the sun is out. He states that his apartment faces a direction where the sun doesn't wake him up, which is preferred.     Patient reports financial strain. He states that he makes enough to pay rent. He will eat out a lot.     Patient reports that he's been going to the bar on the weekends. He denies use of alcohol and does this to be social and be around others.     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 He starts early and now has " to try and get to bed earlier and this has been harder. He has been with this employer for 4 months. He's used to waking up and working later. He states that he has a more physical job.     [facility] reinforced patient reaching out to start counseling. Explored sleep routine and provided psycho-education on sleep hygiene skills.     Progress on Treatment Objective(s) / Homework:  Worsening - PREPARATION (Decided to change - considering how); Intervened by negotiating a change plan and determining options / strategies for behavior change, identifying triggers, exploring social supports, and working towards setting a date to begin behavior change    Motivational Interviewing    MI Intervention: Expressed Empathy/Understanding, Supported Autonomy, Collaboration, Evocation, and Open-ended questions     Change Talk Expressed by the Patient: Desire to change    Provider Response to Change Talk: E - Evoked more info from patient about behavior change, A - Affirmed patient's thoughts, decisions, or attempts at behavior change, R - Reflected patient's change talk, and S - Summarized patient's change talk statements    Also provided psychoeducation about behavioral health condition, symptoms, and treatment options    Care Plan review completed: No    Medication Review:  No current psychiatric medications prescribed Patient has not taken medication and states that he'd prefer not to take medication.    Medication Compliance:  NA    Changes in Health Issues:   None reported    Chemical Use Review:   Substance Use: Chemical use reviewed, no active concerns identified  Patient states that he has alcohol only on rare occasion. He last drank four months ago. He states that he doesn't like feeling out of control. He denies any use of cannabis or other illicit drugs. He drinks caffeine in the form of a soda in the morning.      Tobacco Use: No current tobacco use.       Assessment: Current Emotional / Mental Status (status of significant  "symptoms):  Risk status (Self / Other harm or suicidal ideation)  Patient has had a history of suicidal ideation: patient reports that starting 4 years ago he would have thoughts of wanting to \"pop out of existence\". He denied any specific thoughts of wanting to die or kill himself and denies a history of suicide attempts, self-injurious behavior, homicidal ideation, homicidal behavior and and other safety concerns  Patient denies current fears or concerns for personal safety.  Patient denies current or recent suicidal ideation or behaviors.  Patient denies current or recent homicidal ideation or behaviors.  Patient denies current or recent self injurious behavior or ideation.  Patient denies other safety concerns.  A safety and risk management plan has been developed including: talk to his mom or best friend, call 911 and present to the ER          Appearance:   Appropriate   Eye Contact:   Fair   Psychomotor Behavior: Normal   Attitude:   Cooperative  Pleasant  Orientation:   All  Speech   Rate / Production: Normal/ Responsive Monotone    Volume:  Normal   Mood:    Depressed   Affect:    Flat   Thought Content:  Rumination   Thought Form:  Coherent  Circumstantial  Insight:    Fair     Diagnoses:  1. Adjustment disorder with depressed mood        Collateral Reports Completed:  C-SSRS    Plan: (Homework, other):  Patient was given information about behavioral services and encouraged to schedule a follow up appointment with the clinic [facility] in 3 weeks.  He was also given information about mental health symptoms and treatment options  and Cognitive Behavioral Therapy skills to practice when experiencing depression.  CD Recommendations: No indications of CD issues.     VICKY Guillermo, [facility]     "

## 2024-01-03 ENCOUNTER — OFFICE VISIT (OUTPATIENT)
Dept: BEHAVIORAL HEALTH | Facility: CLINIC | Age: 24
End: 2024-01-03
Payer: COMMERCIAL

## 2024-01-03 DIAGNOSIS — F32.1 MAJOR DEPRESSIVE DISORDER, SINGLE EPISODE, MODERATE (H): Primary | ICD-10-CM

## 2024-01-03 PROCEDURE — 90791 PSYCH DIAGNOSTIC EVALUATION: CPT | Performed by: MARRIAGE & FAMILY THERAPIST

## 2024-01-03 NOTE — PROGRESS NOTES
"[facility]: Integrated Behavioral Health      PATIENT'S NAME: Cruzito GOMEZ Case  PREFERRED NAME: Jean Claude  PRONOUNS:     he/him  MRN: [id number]  : 2000  ADDRESS: [address] [address] [place] [id number]  ACCT. NUMBER:  [id number]  DATE OF SERVICE: 24  START TIME: 3:02pm  END TIME: 4:20pm  PREFERRED PHONE: [phone]  May we leave a program related message: No  EMERGENCY CONTACT: was not obtained  .  SERVICE MODALITY:  In-person    UNIVERSAL ADULT Mental Health DIAGNOSTIC ASSESSMENT    Identifying Information:  Patient is a 23 year old,    individual.  Patient was referred for an assessment by self .  Patient attended the session alone.    Chief Complaint:   The reason for seeking services at this time is: \" not caring about things I care about \". Patient reports. The problem(s) began about three years ago when his relationship ended. He reports that in the last few months his mood has changed and he finds that he lacks energy and he has to \"force\" himself to do things he enjoys. Patient has attempted to resolve these concerns in the past through meeting with this writer a couple times last year. He states that he thought he could manage his symptoms on his own but things haven't improved . Patient reports that yesterday he had a \"breakdown\". He reflected that \"everything kind of hit\" him as he was having financial strain and asked for help from his parents. He called his mom when he was struggling yesterday. He states that he doesn't have the desire to do things he enjoys, such as playing video games. He identified that he typically stays up late. He started a new job about four months ago and his schedule is different from his previous employer. He currently works 5am-1:30pm and he just asked to work 2nd shift.     Social/Family History:  Patient reported they grew up in  [place], [place]. They were raised by biological parents.  Parents stayed . " "Patient reported that their childhood was \"innocent\". He typically went to his mom for help with things and he viewed dad as \"more serious\". Patient is the second born child, he has an older sister and younger brother. Patient described their current relationships with family of origin as good with his siblings, however they have different interests. He states that he has a good relationship with his parents as well.      The patient describes their cultural background as raised Adventist/Anabaptism. They attended [employer]. He states that he was Confirmed and states that it was Confirmation that helped him realize that he didn't really care about Voodoo.  Cultural influences and impact on patient's life structure, values, norms, and healthcare: Time Orientation: likes to be on time for things .  Contextual influences on patient's health include: Contextual Factors: Family Factors- mom works in healthcare, he would attend medical appointments for physical ailments as needed. For mental health, he was wanting to address things on his own rather than seek out help.  Cultural, Contextual, and socioeconomic factors do not affect the patient's access to services.  These factors will be addressed in the Preliminary Treatment plan.  Patient identified their preferred language to be English. Patient reported they do not  need the assistance of an  or other support involved in therapy.     Patient reported had no significant delays in developmental tasks.  Patient's highest education level was high school graduate and some college. Patient identified the following learning problems: none reported.  Modifications will not be used to assist communication in therapy.  Patient reports they are able to understand written materials.    Patient reported the following relationship history he states that he had his first crush at age 11 and he recalls the specific date. He recalls having interests in other girls and didn't " always act on them. He had his first girlfriend when he was a olive in high school. After that ended he dated here and there. His last relationship was 7 months long and ended because he was not in love with her. Patient's current relationship status is single for 2 years. He has been on dates since then.  Patient identified their sexual orientation as heterosexual.  Patient reported having zero child(simran). Patient identified parents and friends as part of their support system.  Patient identified the quality of these relationships as good.     Patient's current living/housing situation involves staying in own home/apartment.  They live with a roommate and they report that housing is stable.     Patient is currently employed full time and reports they are able to function appropriately at work. Patient states that he doesn't find his job fulfilling.  Patient reports their finances are obtained through employment.  Patient does identify finances as a current stressor.  He is considering getting a second job.    Patient reported that they have not been involved with the legal system. Patient denies being on probation / parole / under the jurisdiction of the court.    Patient's Strengths and Limitations:  Patient identified the following strengths or resources that will help them succeed in treatment: commitment to health and well being, family support, and work ethic. Things that may interfere with the patient's success in treatment include: none identified.     Assessments:  The following assessments were completed by patient for this visit:  PHQ9:       4/4/2018     2:00 PM 11/24/2020     8:53 AM 8/16/2022     2:33 PM 12/18/2023     2:33 PM   PHQ-9 SCORE   PHQ-9 Total Score MyChart 15 (Moderately severe depression)  4 (Minimal depression) 10 (Moderate depression)   PHQ-9 Total Score 15 3 4 10     GAD7:       8/16/2022     2:32 PM 12/18/2023     2:34 PM   SHAHEED-7 SCORE   Total Score 6 (mild anxiety) 9 (mild anxiety)    Total Score 6 9     CAGE-AID:       8/16/2022     2:43 PM   CAGE-AID Total Score   Total Score 0   Total Score MyChart 0 (A total score of 2 or greater is considered clinically significant)     PROMIS 10-Global Health (all questions and answers displayed):       8/16/2022     2:42 PM 12/18/2023     2:35 PM   PROMIS 10   In general, would you say your health is: Excellent Excellent   In general, would you say your quality of life is: Excellent Very good   In general, how would you rate your physical health? Excellent Excellent   In general, how would you rate your mental health, including your mood and your ability to think? Fair Fair   In general, how would you rate your satisfaction with your social activities and relationships? Fair Poor   In general, please rate how well you carry out your usual social activities and roles Excellent Very good   To what extent are you able to carry out your everyday physical activities such as walking, climbing stairs, carrying groceries, or moving a chair? Completely Completely   In the past 7 days, how often have you been bothered by emotional problems such as feeling anxious, depressed, or irritable? Often Always   In the past 7 days, how would you rate your fatigue on average? Mild Moderate   In the past 7 days, how would you rate your pain on average, where 0 means no pain, and 10 means worst imaginable pain? 6 5   In general, would you say your health is: 5 5   In general, would you say your quality of life is: 5 4   In general, how would you rate your physical health? 5 5   In general, how would you rate your mental health, including your mood and your ability to think? 2 2   In general, how would you rate your satisfaction with your social activities and relationships? 2 1   In general, please rate how well you carry out your usual social activities and roles. (This includes activities at home, at work and in your community, and responsibilities as a parent, child,  "spouse, employee, friend, etc.) 5 4   To what extent are you able to carry out your everyday physical activities such as walking, climbing stairs, carrying groceries, or moving a chair? 5 5   In the past 7 days, how often have you been bothered by emotional problems such as feeling anxious, depressed, or irritable? 4 5   In the past 7 days, how would you rate your fatigue on average? 2 3   In the past 7 days, how would you rate your pain on average, where 0 means no pain, and 10 means worst imaginable pain? 6 5   Global Mental Health Score 11 8   Global Physical Health Score 17 16   PROMIS TOTAL - SUBSCORES 28 24     [place] Suicide Severity Rating Scale (Lifetime/Recent)      8/16/2022     3:27 PM 12/18/2023     5:34 PM   [place] Suicide Severity Rating (Lifetime/Recent)   Q1 Wish to be Dead (Lifetime) Y Y   Wish to be Dead Description (Lifetime) He states that starting about 3 years ago he wished he could \"pop out of existence\". He states that this would mostly occur at work and it would be to avoid what is going on. Starting about 5 years ago, he states that he experienced thoughts of wishing to \"pop out of existence\".   1. Wish to be Dead (Past 1 Month) Y N   Wish to be Dead Description (Past 1 Month) Patient identified thoughts of wishing he would \"pop out of existence\"    Q2 Non-Specific Active Suicidal Thoughts (Lifetime) N N   Most Severe Ideation Rating (Lifetime) 1 2   Most Severe Ideation Rating (Past 1 Month) 1    Frequency (Lifetime) 2 1   Frequency (Past 1 Month) 1    Duration (Lifetime) 1 1   Duration (Past 1 Month) 1    Controllability (Lifetime) 1 1   Controllability (Past 1 Month) 1    Deterrents (Lifetime) 1 1   Deterrents (Past 1 Month) 1    Reasons for Ideation (Lifetime) 4 4   Reasons for Ideation (Past 1 Month) 4 0   Actual Attempt (Lifetime) N N   Has subject engaged in non-suicidal self-injurious behavior? (Lifetime) N N   Interrupted Attempts (Lifetime) N N   Aborted or Self-Interrupted " Attempt (Lifetime) N N   Preparatory Acts or Behavior (Lifetime) N N   Calculated C-SSRS Risk Score (Lifetime/Recent) Low Risk No Risk Indicated       Personal and Family Medical History:  Patient does report a family history of mental health concerns. His sister has narcolepsy. Patient reports family history is not on file..     Patient does not report Mental Health Diagnosis or Treatment.      Patient has not had a physical exam to rule out medical causes for current symptoms.  Date of last physical exam was greater than a year ago and client was encouraged to schedule an exam with PCP. The patient has a [facility] Provider, who is named Julio Azevedo.  Patient reports no current medical concerns.  Patient denies any issues with pain..   There are not significant appetite / nutritional concerns / weight changes. These may include: no concerns. Patient reports the following sleep concerns:  frequent awakening will wake up in the middle of the night. He doesn't believe he has slept a full 8 hours for a long time.  Patient does report a history of head injury / trauma / cognitive impairment.  He reportedly bruised his head badly almost 20 years ago on a nightstand, this resulted in stitches. He states that when he was younger he also fell in his garage and he may have had a concussion but he doesn't know for sure and says his dad would know.    Patient reports not taking any current medications    Medication Adherence:  Patient reports  not having any current medications prescribed. He states that he doesn't typically take medication .    Patient Allergies:  No Known Allergies    Medical History:  No past medical history on file.      Current Mental Status Exam:   Appearance:  Appropriate    Eye Contact:  Good   Psychomotor:  Restless       Gait / station:  no problem  Attitude / Demeanor: Cooperative  Friendly Pleasant  Speech      Rate / Production: Normal/ Responsive      Volume:  Normal  volume  would get louder at times      Language:  intact  Mood:   Depressed   Affect:   Flat    Thought Content: Rumination   Thought Process: Coherent  Logical       Associations: No loosening of associations  Insight:   Fair   Judgment:  Intact   Orientation:  All  Attention/concentration: Good    Substance Use:   Patient did not report a family history of substance use concerns; see medical history section for details.  Patient has not received chemical dependency treatment in the past.  Patient has not ever been to detox.      Patient is not currently receiving any chemical dependency treatment. Patient reported the following problems as a result of their substance use:   None .    Patient reports using alcohol 1 times per every few months and has 1 mixed drinks at a time. Patient age of first use was not evaluated due to time constraints.  Patient reported date of last use was four months ago.  Patient reports heaviest use is current use.  Patient denies using tobacco.  Patient denies using cannabis.  Patient reports using caffeine 1 times per day and drinks 1 at a time. Patient's age of starting caffeine was not asked due to time constraints.  Patient reports using/abusing the following substance(s). Patient reported no other substance use.     Substance Use: No symptoms    Based on the negative CAGE score and clinical interview there  are not indications of drug or alcohol abuse.    Significant Losses / Trauma / Abuse / Neglect Issues:   Patient   did not serve in the .  There are indications or report of significant loss, trauma, abuse or neglect issues related to: death of grandparent a year ago .  Concerns for possible neglect are not present.    Safety Assessment:   Patient denies current homicidal ideation and behaviors.  Patient denies current self-injurious ideation and behaviors.    Patient denied risk behaviors associated with substance use.   Patient denies any high risk behaviors associated with  mental health symptoms.  Patient reports the following current concerns for their personal safety: None.  Patient reports there no  firearms in the house.       There are no firearms in the home..    History of Safety Concerns:  Patient denied a history of homicidal ideation.     Patient denied a history of personal safety concerns.    Patient denied a history of assaultive behaviors.    Patient denied a history of sexual assault behaviors.     Patient denied a history of risk behaviors associated with substance use.  Patient denies any history of high risk behaviors associated with mental health symptoms.  Patient reports the following protective factors:      Risk Plan:  See Recommendations for Safety and Risk Management Plan    Review of Symptoms per patient report:   Depression: Change in sleep, Lack of interest, Change in energy level, Feelings of hopelessness, Ruminations, and Feeling sad, down, or depressed  Melony:  No Symptoms  Psychosis: No Symptoms  Anxiety: Excessive worry, Nervousness, and Ruminations  Panic:  Sense of impending doom  Post Traumatic Stress Disorder:  No Symptoms   Eating Disorder: No Symptoms  ADD / ADHD:  No symptoms  Conduct Disorder: No symptoms  Autism Spectrum Disorder: No symptoms and however patient states that his last girlfriend said he has autism.  Obsessive Compulsive Disorder: There is some nose itching    Patient reports the following compulsive behaviors and treatment history:  none identified .      Diagnostic Criteria:   Unspecified Anxiety Disorder , Symptoms characteristic of an anxiety disorder that caused clinically significant distress or impairment in social, occupational, or other important areas of functioning predominate but do not meet the full criteria for any of the disorders of the anxiety disorders diagnostic class. Major Depressive Disorder  CRITERIA (A-C) REPRESENT A MAJOR DEPRESSIVE EPISODE - SELECT THESE CRITERIA  A) Single episode - symptoms have been  present during the same 2-week period and represent a change from previous functioning 5 or more symptoms (required for diagnosis)   - Depressed mood. Note: In children and adolescents, can be irritable mood.     - Diminished interest or pleasure in all, or almost all, activities.    - Decreased sleep.    - Fatigue or loss of energy.    - Feelings of worthlessness or inappropriate and excessive guilt.    - Diminished ability to think or concentrate, or indecisiveness.   B) The symptoms cause clinically significant distress or impairment in social, occupational, or other important areas of functioning  C) The episode is not attributable to the physiological effects of a substance or to another medical condition  D) The occurence of major depressive episode is not better explained by other thought / psychotic disorders  E) There has never been a manic episode or hypomanic episode    Functional Status:  Patient reports the following functional impairments:  management of the household and or completion of tasks, relationship(s), self-care, and social interactions.     Nonprogrammatic care:  Patient is requesting basic services to address current mental health concerns.    Clinical Summary:  1. Psychosocial, Cultural and Contextual Factors: depressed mood  .  2. Principal DSM5 Diagnoses  (Sustained by DSM5 Criteria Listed Above):   296.22 (F32.1)  Major Depressive Disorder, Single Episode, Moderate _ and With anxious distress.  3. Other Diagnoses that is relevant to services: none  4. Provisional Diagnosis:  rule out generalized anxiety disorder as evidenced by excess worry and nervousness and rumination.  5. Prognosis: Relieve Acute Symptoms.  6. Likely consequences of symptoms if not treated: worsening depression, increased anxiety.  7. Client strengths include:  employed, open to suggestions / feedback, and wants to learn .     Recommendations:     1. Plan for Safety and Risk Management:   Safety and Risk:  Recommended that patient call 911 or go to the local ED should there be a change in any of these risk factors..          Report to child / adult protection services was NA.     2. Patient's identified  that he is not Rastafarian and that he turns to his family for support .     3. Initial Treatment will focus on:    Depressed Mood - low mood, low energy, sleep disturbance, excessive guilt, worry, ruminations, concentration difficulty .     4. Resources/Service Plan:    services are not indicated.   Modifications to assist communication are not indicated.   Additional disability accommodations are not indicated.      5. Collaboration:   Collaboration / coordination of treatment will be initiated with the following  support professionals: primary care physician.      6.  Referrals:   The following referral(s) will be initiated: [facility].  Writer recommended that he schedule an appointment with his PCP to do a preventative exam, complete comprehensive labs and rule out any medical conditions.     A Release of Information has been obtained for the following:  NA .     Clinical Substantiation/medical necessity for the above recommendations:  patient has experienced a change in his mood and feeling more down, less motivated, decreased energy. He is interested in learning how to make changes. He denies any risk of harm and is appropriate for outpatient level of care.    7. SAUD:    SAUD:  Discussed the general effects of drugs and alcohol on health and well-being. Provider gave patient printed information about the  effects of chemical use on their health and well being. Recommendations: none due to patient uses minimally .     8. Records:   These were reviewed at time of assessment.   Information in this assessment was obtained from the medical record and  provided by patient who is a good historian.    Patient will have open access to their mental health medical record.    9.   Interactive  Complexity: No    10. Safety Plan:  Patient denied any current/recent/lifetime history of suicidal ideation and/or behaviors.  No safety plan indicated at this time.     Provider Name/ Credentials:  VICKY Guillermo, Behavioral Health Clinician    January 3, 2024

## 2024-01-05 PROBLEM — F32.1 MAJOR DEPRESSIVE DISORDER, SINGLE EPISODE, MODERATE (H): Status: ACTIVE | Noted: 2024-01-03

## 2024-01-05 PROBLEM — F32.1 MAJOR DEPRESSIVE DISORDER, SINGLE EPISODE, MODERATE (H): Status: ACTIVE | Noted: 2024-01-05

## 2024-01-24 ENCOUNTER — OFFICE VISIT (OUTPATIENT)
Dept: BEHAVIORAL HEALTH | Facility: CLINIC | Age: 24
End: 2024-01-24
Payer: COMMERCIAL

## 2024-01-24 DIAGNOSIS — F32.1 MAJOR DEPRESSIVE DISORDER, SINGLE EPISODE, MODERATE (H): Primary | ICD-10-CM

## 2024-01-24 PROCEDURE — 90834 PSYTX W PT 45 MINUTES: CPT | Performed by: MARRIAGE & FAMILY THERAPIST

## 2024-01-24 NOTE — PROGRESS NOTES
[facility] Primary Care: Integrated Behavioral Health  January 24, 2024      Behavioral Health Clinician Progress Note    Patient Name: Cruzito Rodas           Service Type:  Individual      Service Location:   Face to Face in Clinic      Session Start Time: 2:09pm  Session End Time: 2:54pm      Session Length: 38 - 52      Attendees: Patient     Service Modality:  In-person      Visit Activities (Refresh list every visit): [facility] Only    Diagnostic Assessment Date: 1/3/2024  Treatment Plan Review Date: 1/26/2024  See Flowsheets for today's PHQ-9 and SHAHEED-7 results  Previous PHQ-9:       11/24/2020     8:53 AM 8/16/2022     2:33 PM 12/18/2023     2:33 PM   PHQ-9 SCORE   PHQ-9 Total Score MyChart  4 (Minimal depression) 10 (Moderate depression)   PHQ-9 Total Score 3 4 10     Previous SHAHEED-7:       8/16/2022     2:32 PM 12/18/2023     2:34 PM   SHAHEED-7 SCORE   Total Score 6 (mild anxiety) 9 (mild anxiety)   Total Score 6 9       TONE LEVEL:       No data to display                DATA  Extended Session (60+ minutes): No  Interactive Complexity: No  Crisis: No  [facility] Patient: No    Treatment Objective(s) Addressed in This Session:  Target Behavior(s):  depression    Depressed Mood: Increase interest, engagement, and pleasure in doing things  Decrease frequency and intensity of feeling down, depressed, hopeless    Current Stressors / Issues:  Patient reports that he's okay. He states that the weather has been better, with more sun and now warmer temps and he finds this helping him some.     Patient states that he had a week of working second shift. He weighed the pros and cons and has determined that his main shift was preferred to the second shift.     Discussed sleep habits. Patient states that he sleeps with LED or rope lights, music and a fan. [facility] provided psycho-education on sleep hygiene tips and discussed options on shifting things for improved sleep.    Patient talked about having more energy  today. He identified that he felt this was because of the work he completed today as well as being able to see his parents for dinner. [facility] reflected that it sounds like he feels a sense of accomplishment with his work and that having plans for social interaction and a change in today's schedule were all seen as positives. Explored patient's social interactions and ways to incorporate this. Reflected patient having time after work and not knowing what to do with it. Encouraged patient to think about hobbies or possible activities to engage in.    Co-developed treatment plan and goals. Patient reflected that he has heard others say that he's not open-minded and he would like to work on shifting this if possible.      Progress on Treatment Objective(s) / Homework:  Minimal progress - PREPARATION (Decided to change - considering how); Intervened by negotiating a change plan and determining options / strategies for behavior change, identifying triggers, exploring social supports, and working towards setting a date to begin behavior change    Motivational Interviewing    MI Intervention: Expressed Empathy/Understanding, Supported Autonomy, Collaboration, Evocation, and Open-ended questions     Change Talk Expressed by the Patient: Desire to change    Provider Response to Change Talk: E - Evoked more info from patient about behavior change, A - Affirmed patient's thoughts, decisions, or attempts at behavior change, R - Reflected patient's change talk, and S - Summarized patient's change talk statements    Also provided psychoeducation about behavioral health condition, symptoms, and treatment options    Care Plan review completed: No    Medication Review:  No current psychiatric medications prescribed     Medication Compliance:  NA    Changes in Health Issues:   None reported    Chemical Use Review:   Substance Use: Chemical use reviewed, no active concerns identified  .      Tobacco Use: No current tobacco use.    "    Assessment: Current Emotional / Mental Status (status of significant symptoms):  Risk status (Self / Other harm or suicidal ideation)  Patient has had a history of suicidal ideation: patient reports that starting 4 years ago he would have thoughts of wanting to \"pop out of existence\". He denied any specific thoughts of wanting to die or kill himself and denies a history of suicide attempts, self-injurious behavior, homicidal ideation, homicidal behavior and and other safety concerns  Patient denies current fears or concerns for personal safety.  Patient denies current or recent suicidal ideation or behaviors.  Patient denies current or recent homicidal ideation or behaviors.  Patient denies current or recent self injurious behavior or ideation.  Patient denies other safety concerns.  A safety and risk management plan has been developed including: talk to his mom or best friend, call 911 and present to the ER          Appearance:   Appropriate   Eye Contact:   Fair   Psychomotor Behavior: Normal   Attitude:   Cooperative  Pleasant  Orientation:   All  Speech   Rate / Production: Normal/ Responsive Monotone    Volume:  Normal   Mood:    Euthymic  Affect:    Appropriate   Thought Content:  Clear  Rumination   Thought Form:  Coherent  Circumstantial  Insight:    Fair     Diagnoses:  1. Major depressive disorder, single episode, moderate (H)        Collateral Reports Completed:  Not Applicable    Plan: (Homework, other):  Patient was given information about behavioral services and encouraged to schedule a follow up appointment with the clinic [facility] in 3 weeks.  He was also given information about mental health symptoms and treatment options  and Cognitive Behavioral Therapy skills to practice when experiencing depression.  CD Recommendations: No indications of CD issues.     VICKY Guillermo, [facility]       ______________________________________________________________________                                          "     Individual Treatment Plan    Patient's Name: Cruzito GOMEZ Case  YOB: 2000    Date of Creation: 1/24/2024  Date Treatment Plan Last Reviewed/Revised: 1/24/2024    DSM5 Diagnoses: 296.22 (F32.1)  Major Depressive Disorder, Single Episode, Moderate With anxious distress  Psychosocial / Contextual Factors: living with a roommate, works full-time, single  PROMIS (reviewed every 90 days): PROMIS 10-Global Health (only subscores and total score):       8/16/2022     2:42 PM 12/18/2023     2:35 PM   PROMIS-10 Scores Only   Global Mental Health Score 11 8   Global Physical Health Score 17 16   PROMIS TOTAL - SUBSCORES 28 24        Referral / Collaboration:  Referral to another professional/service is not indicated at this time..    Anticipated number of session for this episode of care: 9-12 sessions  Anticipation frequency of session: Every other week  Anticipated Duration of each session: 38-52 minutes  Treatment plan will be reviewed in 90 days or when goals have been changed.       MeasurableTreatment Goal(s) related to diagnosis / functional impairment(s)  Goal 1: Patient will effectively reduce depressive symptoms as evidenced by a reduced PHQ9 score of 5 or less with occurrence of several days or less.     I will know I've met my goal when I can be more open-minded about things.      Objective #A (Patient Action)    Patient will Identify negative self-talk and behaviors: challenge core beliefs, myths, and actions  Status: New - Date: 1/24/2024      Intervention(s)  [facility] will  provide psycho-education on cognitive distortions and ways to challenge unhelpful thoughts and use positive affirmations .    Objective #B  Patient will attend and participate in social or recreational activities 1 time a week  Status: New - Date: 1/24/2024      Intervention(s)  [facility] will  help patient identify activities to do and social events to help him create more connection and improve mood .    Patient has reviewed and  agreed to the above plan.    Written by  VICKY Guillermo, [facility]

## 2024-02-14 ENCOUNTER — OFFICE VISIT (OUTPATIENT)
Dept: FAMILY MEDICINE | Facility: CLINIC | Age: 24
End: 2024-02-14
Payer: COMMERCIAL

## 2024-02-14 VITALS
DIASTOLIC BLOOD PRESSURE: 80 MMHG | WEIGHT: 189.1 LBS | TEMPERATURE: 97.1 F | RESPIRATION RATE: 16 BRPM | SYSTOLIC BLOOD PRESSURE: 128 MMHG | BODY MASS INDEX: 28.01 KG/M2 | HEIGHT: 69 IN | OXYGEN SATURATION: 99 % | HEART RATE: 72 BPM

## 2024-02-14 DIAGNOSIS — Z13.220 LIPID SCREENING: ICD-10-CM

## 2024-02-14 DIAGNOSIS — Z13.1 DIABETES MELLITUS SCREENING: ICD-10-CM

## 2024-02-14 DIAGNOSIS — Z00.00 ROUTINE GENERAL MEDICAL EXAMINATION AT A HEALTH CARE FACILITY: Primary | ICD-10-CM

## 2024-02-14 DIAGNOSIS — F32.1 MAJOR DEPRESSIVE DISORDER, SINGLE EPISODE, MODERATE (H): ICD-10-CM

## 2024-02-14 PROCEDURE — 99395 PREV VISIT EST AGE 18-39: CPT | Performed by: STUDENT IN AN ORGANIZED HEALTH CARE EDUCATION/TRAINING PROGRAM

## 2024-02-14 SDOH — HEALTH STABILITY: PHYSICAL HEALTH: ON AVERAGE, HOW MANY MINUTES DO YOU ENGAGE IN EXERCISE AT THIS LEVEL?: 0 MIN

## 2024-02-14 SDOH — HEALTH STABILITY: PHYSICAL HEALTH: ON AVERAGE, HOW MANY DAYS PER WEEK DO YOU ENGAGE IN MODERATE TO STRENUOUS EXERCISE (LIKE A BRISK WALK)?: 0 DAYS

## 2024-02-14 ASSESSMENT — PAIN SCALES - GENERAL: PAINLEVEL: NO PAIN (0)

## 2024-02-14 ASSESSMENT — SOCIAL DETERMINANTS OF HEALTH (SDOH): HOW OFTEN DO YOU GET TOGETHER WITH FRIENDS OR RELATIVES?: MORE THAN THREE TIMES A WEEK

## 2024-02-14 ASSESSMENT — PATIENT HEALTH QUESTIONNAIRE - PHQ9
10. IF YOU CHECKED OFF ANY PROBLEMS, HOW DIFFICULT HAVE THESE PROBLEMS MADE IT FOR YOU TO DO YOUR WORK, TAKE CARE OF THINGS AT HOME, OR GET ALONG WITH OTHER PEOPLE: SOMEWHAT DIFFICULT
SUM OF ALL RESPONSES TO PHQ QUESTIONS 1-9: 9
SUM OF ALL RESPONSES TO PHQ QUESTIONS 1-9: 9

## 2024-02-14 NOTE — PATIENT INSTRUCTIONS
Preventive Care Advice   This is general advice given by our system to help you stay healthy. However, your care team may have specific advice just for you. Please talk to your care team about your preventive care needs.  Nutrition  Eat 5 or more servings of fruits and vegetables each day.  Try wheat bread, brown rice and whole grain pasta (instead of white bread, rice, and pasta).  Get enough calcium and vitamin D. Check the label on foods and aim for 100% of the RDA (recommended daily allowance).  Lifestyle  Exercise at least 150 minutes each week  (30 minutes a day, 5 days a week).  Do muscle strengthening activities 2 days a week. These help control your weight and prevent disease.  No smoking.  Wear sunscreen to prevent skin cancer.  Have a dental exam and cleaning every 6 months.  Yearly exams  See your health care team every year to talk about:  Any changes in your health.  Any medicines your care team has prescribed.  Preventive care, family planning, and ways to prevent chronic diseases.  Shots (vaccines)   HPV shots (up to age 26), if you've never had them before.  Hepatitis B shots (up to age 59), if you've never had them before.  COVID-19 shot: Get this shot when it's due.  Flu shot: Get a flu shot every year.  Tetanus shot: Get a tetanus shot every 10 years.  Pneumococcal, hepatitis A, and RSV shots: Ask your care team if you need these based on your risk.  Shingles shot (for age 50 and up)  General health tests  Diabetes screening:  Starting at age 35, Get screened for diabetes at least every 3 years.  If you are younger than age 35, ask your care team if you should be screened for diabetes.  Cholesterol test: At age 39, start having a cholesterol test every 5 years, or more often if advised.  Bone density scan (DEXA): At age 50, ask your care team if you should have this scan for osteoporosis (brittle bones).  Hepatitis C: Get tested at least once in your life.  STIs (sexually transmitted  infections)  Before age 24: Ask your care team if you should be screened for STIs.  After age 24: Get screened for STIs if you're at risk. You are at risk for STIs (including HIV) if:  You are sexually active with more than one person.  You don't use condoms every time.  You or a partner was diagnosed with a sexually transmitted infection.  If you are at risk for HIV, ask about PrEP medicine to prevent HIV.  Get tested for HIV at least once in your life, whether you are at risk for HIV or not.  Cancer screening tests  Cervical cancer screening: If you have a cervix, begin getting regular cervical cancer screening tests starting at age 21.  Breast cancer scan (mammogram): If you've ever had breasts, begin having regular mammograms starting at age 40. This is a scan to check for breast cancer.  Colon cancer screening: It is important to start screening for colon cancer at age 45.  Have a colonoscopy test every 10 years (or more often if you're at risk) Or, ask your provider about stool tests like a FIT test every year or Cologuard test every 3 years.  To learn more about your testing options, visit:   https://www.Poptent/931627.pdf.  For help making a decision, visit:   https://bit.ly/rp32989.  Prostate cancer screening test: If you have a prostate, ask your care team if a prostate cancer screening test (PSA) at age 55 is right for you.  Lung cancer screening: If you are a current or former smoker ages 50 to 80, ask your care team if ongoing lung cancer screenings are right for you.  For informational purposes only. Not to replace the advice of your health care provider. Copyright   2023 [facility]. All rights reserved. Clinically reviewed by the [facility] Transitions Program. IT'SUGAR [id number] - REV 01/24.    Learning About Stress  What is stress?     Stress is your body's response to a hard situation. Your body can have a physical, emotional, or mental response. Stress is a fact of life for  most people, and it affects everyone differently. What causes stress for you may not be stressful for someone else.  A lot of things can cause stress. You may feel stress when you go on a job interview, take a test, or run a race. This kind of short-term stress is normal and even useful. It can help you if you need to work hard or react quickly. For example, stress can help you finish an important job on time.  Long-term stress is caused by ongoing stressful situations or events. Examples of long-term stress include long-term health problems, ongoing problems at work, or conflicts in your family. Long-term stress can harm your health.  How does stress affect your health?  When you are stressed, your body responds as though you are in danger. It makes hormones that speed up your heart, make you breathe faster, and give you a burst of energy. This is called the fight-or-flight stress response. If the stress is over quickly, your body goes back to normal and no harm is done.  But if stress happens too often or lasts too long, it can have bad effects. Long-term stress can make you more likely to get sick, and it can make symptoms of some diseases worse. If you tense up when you are stressed, you may develop neck, shoulder, or low back pain. Stress is linked to high blood pressure and heart disease.  Stress also harms your emotional health. It can make you raygoza, tense, or depressed. Your relationships may suffer, and you may not do well at work or school.  What can you do to manage stress?  You can try these things to help manage stress:   Do something active. Exercise or activity can help reduce stress. Walking is a great way to get started. Even everyday activities such as housecleaning or yard work can help.  Try yoga or lacy chi. These techniques combine exercise and meditation. You may need some training at first to learn them.  Do something you enjoy. For example, listen to music or go to a movie. Practice your  "hobby or do volunteer work.  Meditate. This can help you relax, because you are not worrying about what happened before or what may happen in the future.  Do guided imagery. Imagine yourself in any setting that helps you feel calm. You can use online videos, books, or a teacher to guide you.  Do breathing exercises. For example:  From a standing position, bend forward from the waist with your knees slightly bent. Let your arms dangle close to the floor.  Breathe in slowly and deeply as you return to a standing position. Roll up slowly and lift your head last.  Hold your breath for just a few seconds in the standing position.  Breathe out slowly and bend forward from the waist.  Let your feelings out. Talk, laugh, cry, and express anger when you need to. Talking with supportive friends or family, a counselor, or a baltazar leader about your feelings is a healthy way to relieve stress. Avoid discussing your feelings with people who make you feel worse.  Write. It may help to write about things that are bothering you. This helps you find out how much stress you feel and what is causing it. When you know this, you can find better ways to cope.  What can you do to prevent stress?  You might try some of these things to help prevent stress:  Manage your time. This helps you find time to do the things you want and need to do.  Get enough sleep. Your body recovers from the stresses of the day while you are sleeping.  Get support. Your family, friends, and community can make a difference in how you experience stress.  Limit your news feed. Avoid or limit time on social media or news that may make you feel stressed.  Do something active. Exercise or activity can help reduce stress. Walking is a great way to get started.  Where can you learn more?  Go to https://www.healthwise.net/patiented  Enter N032 in the search box to learn more about \"Learning About Stress.\"  Current as of: February 26, 2023               Content Version: " 13.8    3007-5530 [employer].   Care instructions adapted under license by your healthcare professional. If you have questions about a medical condition or this instruction, always ask your healthcare professional. [employer] disclaims any warranty or liability for your use of this information.      Learning About Depression Screening  What is depression screening?  Depression screening is a way to see if you have depression symptoms. It may be done by a doctor or counselor. It's often part of a routine checkup. That's because your mental health is just as important as your physical health.  Depression is a mental health condition that affects how you feel, think, and act. You may:  Have less energy.  Lose interest in your daily activities.  Feel sad and grouchy for a long time.  Depression is very common. It affects people of all ages.  Many things can lead to depression. Some people become depressed after they have a stroke or find out they have a major illness like cancer or heart disease. The death of a loved one or a breakup may lead to depression. It can run in families. Most experts believe that a combination of inherited genes and stressful life events can cause it.  What happens during screening?  You may be asked to fill out a form about your depression symptoms. You and the doctor will discuss your answers. The doctor may ask you more questions to learn more about how you think, act, and feel.  What happens after screening?  If you have symptoms of depression, your doctor will talk to you about your options.  Doctors usually treat depression with medicines or counseling. Often, combining the two works best. Many people don't get help because they think that they'll get over the depression on their own. But people with depression may not get better unless they get treatment.  The cause of depression is not well understood. There may be many factors involved. But if you have  "depression, it's not your fault.  A serious symptom of depression is thinking about death or suicide. If you or someone you care about talks about this or about feeling hopeless, get help right away.  It's important to know that depression can be treated. Medicine, counseling, and self-care may help.  Where can you learn more?  Go to https://www.[employer].net/patiented  Enter T185 in the search box to learn more about \"Learning About Depression Screening.\"  Current as of: June 25, 2023               Content Version: 13.8    1676-1354 [employer].   Care instructions adapted under license by your healthcare professional. If you have questions about a medical condition or this instruction, always ask your healthcare professional. [employer] disclaims any warranty or liability for your use of this information.      "

## 2024-02-14 NOTE — COMMUNITY RESOURCES LIST (ENGLISH)
02/14/2024   [facility] [employer]  N/A  For questions about this resource list or additional care needs, please contact your primary care clinic or care manager.  Phone: [phone]   Email: N/A   Address: [address] [place] [id number]   Hours: N/A        Exercise and Recreation       Gym or workout facility  1  [employer]  [place] Distance: 5.16 miles      In-Person   [address] [place] [address]  Language: English  Hours: Mon - Sun Open 24 Hours  Fees: Insurance, Self Pay, Sliding Fee   Phone: (115) [phone] Email: [email or web address] Website: [email or web address]://[email or web address]/gyms/267/zeeqvjqrz-jl-14476/     2  [employer] [facility] Distance: 13.11 miles      In-Person   [address] [place] [address]  Language: English  Hours: Mon - Sun Open 24 Hours  Fees: Insurance, Self Pay, Sliding Fee   Phone: (663) [phone] Email: [email or web address] Website: [email or web address]/gyms/50/yc-rhiihyo-ys-94768/          Food and Nutrition       Food pantry  3  [facility] ([facility] Distance: 13.59 miles      Pickup   [address] [place] [address]  Language: English, Armenian  Hours: Mon 10:00 AM - 1:30 PM Appt. Only, Wed 10:00 AM - 1:30 PM Appt. Only, Thu 4:30 PM - 6:00 PM Appt. Only, Fri 10:00 AM - 12:00 PM  Fees: Free   Phone: (116) 386-2451 Email: info@[employer] Website: http://www.[employer].org     4  [facility] Distance: 15.24 miles      In-Person, Pickup, Phone/Virtual   [address] [place] [address]  Language: English  Hours: Mon 5:30 PM - 7:30 PM Appt. Only, Tue 11:00 AM - 12:30 PM , Wed 9:00 AM - 11:00 AM Appt. Only, Thu 5:30 PM - 7:30 PM Appt. Only, Fri 11:30 AM - 12:30 PM  Fees: Free   Phone: (875) 353-6030 Email: [email or web address] Website: [email or web address].org/     SNAP application assistance  5  [facility]  Distance: 14.77 miles      In-Person   [address] [place] [address]  Language: English  Hours: Mon - Fri 8:00 AM - 4:30 PM  Fees: Free   Phone: (731) 881-2425 Email: [email or web address]. Website: http://[email or web address]/[email or web address].php     S[employer] kitchen or free meals  6  [facility] Distance: 23.23 miles      In-Person   [address] [place] [address]  Language: English  Hours: Wed 5:30 PM - 6:15 PM  Fees: Free   Phone: (527) 288-7911 Email: [email or web address] Website: http://www.[facility].org/          Important Numbers & Websites       Emergency Services   911  [facility]   311  Poison Control   (699) 658-4005  Suicide Prevention Lifeline   (309) 326-6247 (TALK)  Child Abuse Hotline   (160) 848-1440 (4-A-Child)  Sexual Assault Hotline   (325) 384-1885 (HOPE)  National Runaway Safeline   (840) 678-1915 (RUNAWAY)  All-Options Talkline   (678) 578-3066  Substance Abuse Referral   (429) 662-3132 (HELP)

## 2024-02-14 NOTE — PROGRESS NOTES
"Preventive Care Visit  [facility]  Rai Kerns MD, Family Medicine  Feb 14, 2024    Assessment & Plan   Problem List Items Addressed This Visit          Behavioral    Major depressive disorder, single episode, moderate (H), with anxious distress     Other Visit Diagnoses       Routine general medical examination at a health care facility    -  Primary    Diabetes mellitus screening        Relevant Orders    Basic metabolic panel  (Ca, Cl, CO2, Creat, Gluc, K, Na, BUN)    Lipid screening        Relevant Orders    Lipid panel reflex to direct LDL Non-fasting           Age-appropriate immunization and screening discussed.  History updated.  Will send for labs today.  He feels like mood is stable and wants to keep working with [facility].  Not interested in other medication options and feels safe at home now.  Does have crisis line available.  Encouraged regular exercise and healthy well-balanced diet.  He will consider updating his tetanus shot in the future.       BMI  Estimated body mass index is 27.72 kg/m  as calculated from the following:    Height as of this encounter: 1.759 m (5' 9.25\").    Weight as of this encounter: 85.8 kg (189 lb 1.6 oz).   Weight management plan: Discussed healthy diet and exercise guidelines    Counseling  Appropriate preventive services were discussed with this patient, including applicable screening as appropriate for fall prevention, nutrition, physical activity, Tobacco-use cessation, weight loss and cognition.  Checklist reviewing preventive services available has been given to the patient.  Reviewed patient's diet, addressing concerns and/or questions.   The patient was instructed to see the dentist every 6 months.   The patient's PHQ-9 score is consistent with mild depression. He was provided with information regarding depression.     Patient has been advised of split billing requirements and indicates understanding: Yes        Subjective   Jean Claude " is a 23 year old, presenting for the following:  Physical        2/14/2024     1:27 PM   Additional Questions   Roomed by Sindi PENALOZA        Health Care Directive  Patient does not have a Health Care Directive or Living Will: Discussed advance care planning with patient; information given to patient to review.    HPI        2/14/2024   General Health   How would you rate your overall physical health? Good   Feel stress (tense, anxious, or unable to sleep) Rather much   (!) STRESS CONCERN      2/14/2024   Nutrition   Three or more servings of calcium each day? (!) NO   Diet: Regular (no restrictions)   How many servings of fruit and vegetables per day? (!) 0-1   How many sweetened beverages each day? (!) 3         2/14/2024   Exercise   Days per week of moderate/strenous exercise 0 days   Average minutes spent exercising at this level 0 min   (!) EXERCISE CONCERN      2/14/2024   Social Factors   Frequency of gathering with friends or relatives More than three times a week   Worry food won't last until get money to buy more No   Food not last or not have enough money for food? Yes   Do you have housing?  No   Are you worried about losing your housing? No   Lack of transportation? No   Unable to get utilities (heat,electricity)? No   Want help with housing or utility concern? No   (!) FOOD SECURITY CONCERN PRESENT(!) HOUSING CONCERN PRESENT      2/14/2024   Dental   Dentist two times every year? (!) NO         2/14/2024   TB Screening   Were you born outside of [place]?  No       Today's PHQ-9 Score:       2/14/2024     1:23 PM   PHQ-9 SCORE   PHQ-9 Total Score MyChart 9 (Mild depression)   PHQ-9 Total Score 9         2/14/2024   Substance Use   Alcohol more than 3/day or more than 7/wk Not Applicable   Do you use any other substances recreationally? No     Social History     Tobacco Use    Smoking status: Never    Smokeless tobacco: Never   Vaping Use    Vaping Use: Every day    Substances: Nicotine    Devices: Refillable  "tank   Substance Use Topics    Alcohol use: Yes     Comment: occasional    Drug use: No             2/14/2024   One time HIV Screening   Previous HIV test? No         2/14/2024   STI Screening   New sexual partner(s) since last STI/HIV test? No         2/14/2024   Contraception/Family Planning   Questions about contraception or family planning No        Reviewed and updated as needed this visit by Provider                    No past medical history on file.  Past Surgical History:   Procedure Laterality Date    CYSTECTOMY PILONIDAL N/A 10/6/2021    Procedure: EXCISION, PILONIDAL CYST;  Surgeon: Terence Nguyen MD;  Location: [facility]         Review of Systems  Constitutional, HEENT, cardiovascular, pulmonary, GI, , musculoskeletal, neuro, skin, endocrine and psych systems are negative, except as otherwise noted.     Objective    Exam  /80 (BP Location: Right arm, Patient Position: Chair)   Pulse 72   Temp 97.1  F (36.2  C) (Temporal)   Resp 16   Ht 1.759 m (5' 9.25\")   Wt 85.8 kg (189 lb 1.6 oz)   SpO2 99%   BMI 27.72 kg/m     Estimated body mass index is 27.72 kg/m  as calculated from the following:    Height as of this encounter: 1.759 m (5' 9.25\").    Weight as of this encounter: 85.8 kg (189 lb 1.6 oz).    Physical Exam  GENERAL: alert and no distress  EYES: Eyes grossly normal to inspection, PERRL and conjunctivae and sclerae normal  HENT: ear canals and TM's normal, nose and mouth without ulcers or lesions  NECK: no adenopathy, no asymmetry, masses, or scars  RESP: lungs clear to auscultation - no rales, rhonchi or wheezes  CV: regular rate and rhythm, normal S1 S2, no S3 or S4, no murmur, click or rub, no peripheral edema  ABDOMEN: soft, nontender, no hepatosplenomegaly, no masses and bowel sounds normal  MS: no gross musculoskeletal defects noted, no edema  SKIN: no suspicious lesions or rashes  NEURO: Normal strength and tone, mentation intact and speech normal  PSYCH: mentation appears " normal, affect normal/bright      Signed Electronically by: Rai Kerns MD    Answers submitted by the patient for this visit:  Patient Health Questionnaire (Submitted on 2/14/2024)  If you checked off any problems, how difficult have these problems made it for you to do your work, take care of things at home, or get along with other people?: Somewhat difficult  PHQ9 TOTAL SCORE: 9

## 2024-02-21 ENCOUNTER — OFFICE VISIT (OUTPATIENT)
Dept: BEHAVIORAL HEALTH | Facility: CLINIC | Age: 24
End: 2024-02-21
Payer: COMMERCIAL

## 2024-02-21 DIAGNOSIS — F32.1 MAJOR DEPRESSIVE DISORDER, SINGLE EPISODE, MODERATE (H): Primary | ICD-10-CM

## 2024-02-21 PROCEDURE — 90834 PSYTX W PT 45 MINUTES: CPT | Performed by: MARRIAGE & FAMILY THERAPIST

## 2024-02-21 NOTE — PROGRESS NOTES
"[facility] Primary Care: Integrated Behavioral Health  February 21, 2024      Behavioral Health Clinician Progress Note    Patient Name: Cruzito GOMEZ Case           Service Type:  Individual      Service Location:   Face to Face in Clinic      Session Start Time: 2:23pm  Session End Time: 3:13pm      Session Length: 38 - 52      Attendees: Patient     Service Modality:  In-person      Visit Activities (Refresh list every visit): [facility] Only    Diagnostic Assessment Date: 1/3/2024  Treatment Plan Review Date: 1/26/2024  See Flowsheets for today's PHQ-9 and SHAHEED-7 results  Previous PHQ-9:       8/16/2022     2:33 PM 12/18/2023     2:33 PM 2/14/2024     1:23 PM   PHQ-9 SCORE   PHQ-9 Total Score MyChart 4 (Minimal depression) 10 (Moderate depression) 9 (Mild depression)   PHQ-9 Total Score 4 10 9     Previous SHAHEED-7:       8/16/2022     2:32 PM 12/18/2023     2:34 PM   SHAHEED-7 SCORE   Total Score 6 (mild anxiety) 9 (mild anxiety)   Total Score 6 9       TONE LEVEL:       No data to display                DATA  Extended Session (60+ minutes): No  Interactive Complexity: No  Crisis: No  [facility] Patient: No    Treatment Objective(s) Addressed in This Session:  Target Behavior(s):  depression    Depressed Mood: Increase interest, engagement, and pleasure in doing things  Decrease frequency and intensity of feeling down, depressed, hopeless    Current Stressors / Issues:  Patient reports that he's doing pretty well. He states that the eyal weather has helped. He reports that he continues to struggle with sleep and doesn't think there's anything he can do anything to improve this. He identified feeling as though he's \"wasting time\" when he's not doing anything after work before he goes to bed. Patient processed his feelings on being single and desiring to be in a relationship. He talked about his \"goal\" of being in a long-term relationship. [facility] reframed that he has a desire to be in a relationship and encouraged " "patient to consider personal goals that he could work towards. He identified wanting to be more financially stable and being \"less obsessive\" about things he doesn't have control over.    [facility] explored patient's social interactions and how he meets others. Also discussed expectations and values. BHC reflected judgmental language around how he spends his time after work and how he's adjusting to not having the same activities he had when he was younger and involved in other activities.    Addressed sleep and reviewed sleep hygiene. Suggested using the Insomnia  margo and discussed how to use this. Explored other ways to improve sleep, such as going to bed when he's tired and maybe considering ways to feel more comfortable and using something like a weighted blanket. Patient was open to moving his bedtime back to later in the evening.      Progress on Treatment Objective(s) / Homework:  Minimal progress - PREPARATION (Decided to change - considering how); Intervened by negotiating a change plan and determining options / strategies for behavior change, identifying triggers, exploring social supports, and working towards setting a date to begin behavior change    Motivational Interviewing    MI Intervention: Expressed Empathy/Understanding, Supported Autonomy, Collaboration, Evocation, and Open-ended questions     Change Talk Expressed by the Patient: Desire to change    Provider Response to Change Talk: E - Evoked more info from patient about behavior change, A - Affirmed patient's thoughts, decisions, or attempts at behavior change, R - Reflected patient's change talk, and S - Summarized patient's change talk statements    Also provided psychoeducation about behavioral health condition, symptoms, and treatment options    Care Plan review completed: No    Medication Review:  No current psychiatric medications prescribed     Medication Compliance:  NA    Changes in Health Issues:   None reported    Chemical Use " "Review:   Substance Use: Chemical use reviewed, no active concerns identified  .      Tobacco Use: No current tobacco use.       Assessment: Current Emotional / Mental Status (status of significant symptoms):  Risk status (Self / Other harm or suicidal ideation)  Patient has had a history of suicidal ideation: patient reports that starting 4 years ago he would have thoughts of wanting to \"pop out of existence\". He denied any specific thoughts of wanting to die or kill himself and denies a history of suicide attempts, self-injurious behavior, homicidal ideation, homicidal behavior and and other safety concerns  Patient denies current fears or concerns for personal safety.  Patient denies current or recent suicidal ideation or behaviors.  Patient denies current or recent homicidal ideation or behaviors.  Patient denies current or recent self injurious behavior or ideation.  Patient denies other safety concerns.  A safety and risk management plan has been developed including: talk to his mom or best friend, call 911 and present to the ER          Appearance:   Appropriate   Eye Contact:   Fair   Psychomotor Behavior: Normal   Attitude:   Cooperative  Pleasant  Orientation:   All  Speech   Rate / Production: Normal/ Responsive   Volume:  Normal   Mood:    Euthymic  Affect:    Appropriate   Thought Content:  Clear  Rumination   Thought Form:  Coherent  Circumstantial  Insight:    Fair     Diagnoses:  1. Major depressive disorder, single episode, moderate (H)          Collateral Reports Completed:  Not Applicable    Plan: (Homework, other):  Patient was given information about behavioral services and encouraged to schedule a follow up appointment with the clinic [facility] in 1 month.  He was also given information about mental health symptoms and treatment options  and Cognitive Behavioral Therapy skills to practice when experiencing depression.  CD Recommendations: No indications of CD issues. Patient will consider use of " Insomnia  margo.    VICKY Guillermo, [facility]       ______________________________________________________________________                                              Individual Treatment Plan    Patient's Name: Cruzito GOMEZ Case  YOB: 2000    Date of Creation: 1/24/2024  Date Treatment Plan Last Reviewed/Revised: 1/24/2024    DSM5 Diagnoses: 296.22 (F32.1)  Major Depressive Disorder, Single Episode, Moderate With anxious distress  Psychosocial / Contextual Factors: living with a roommate, works full-time, single  PROMIS (reviewed every 90 days): PROMIS 10-Global Health (only subscores and total score):       8/16/2022     2:42 PM 12/18/2023     2:35 PM   PROMIS-10 Scores Only   Global Mental Health Score 11 8   Global Physical Health Score 17 16   PROMIS TOTAL - SUBSCORES 28 24        Referral / Collaboration:  Referral to another professional/service is not indicated at this time..    Anticipated number of session for this episode of care: 9-12 sessions  Anticipation frequency of session: Every other week  Anticipated Duration of each session: 38-52 minutes  Treatment plan will be reviewed in 90 days or when goals have been changed.       MeasurableTreatment Goal(s) related to diagnosis / functional impairment(s)  Goal 1: Patient will effectively reduce depressive symptoms as evidenced by a reduced PHQ9 score of 5 or less with occurrence of several days or less.     I will know I've met my goal when I can be more open-minded about things.      Objective #A (Patient Action)    Patient will Identify negative self-talk and behaviors: challenge core beliefs, myths, and actions  Status: New - Date: 1/24/2024      Intervention(s)  [facility] will  provide psycho-education on cognitive distortions and ways to challenge unhelpful thoughts and use positive affirmations .    Objective #B  Patient will attend and participate in social or recreational activities 1 time a week  Status: New - Date: 1/24/2024       Intervention(s)  [facility] will  help patient identify activities to do and social events to help him create more connection and improve mood .    Patient has reviewed and agreed to the above plan.    Written by  VICKY Guillermo, [facility]

## 2024-04-03 ENCOUNTER — OFFICE VISIT (OUTPATIENT)
Dept: BEHAVIORAL HEALTH | Facility: CLINIC | Age: 24
End: 2024-04-03
Payer: COMMERCIAL

## 2024-04-03 DIAGNOSIS — F32.1 MAJOR DEPRESSIVE DISORDER, SINGLE EPISODE, MODERATE (H): Primary | ICD-10-CM

## 2024-04-03 PROCEDURE — 90834 PSYTX W PT 45 MINUTES: CPT | Performed by: MARRIAGE & FAMILY THERAPIST

## 2024-04-03 ASSESSMENT — PATIENT HEALTH QUESTIONNAIRE - PHQ9
SUM OF ALL RESPONSES TO PHQ QUESTIONS 1-9: 8
SUM OF ALL RESPONSES TO PHQ QUESTIONS 1-9: 8
10. IF YOU CHECKED OFF ANY PROBLEMS, HOW DIFFICULT HAVE THESE PROBLEMS MADE IT FOR YOU TO DO YOUR WORK, TAKE CARE OF THINGS AT HOME, OR GET ALONG WITH OTHER PEOPLE: NOT DIFFICULT AT ALL

## 2024-04-03 NOTE — PROGRESS NOTES
[facility] Primary Care: Integrated Behavioral Health  April 3, 2024      Behavioral Health Clinician Progress Note    Patient Name: Cruzito Rodas           Service Type:  Individual      Service Location:   Face to Face in Clinic      Session Start Time: 3:09pm  Session End Time: 4:00pm      Session Length: 38 - 52      Attendees: Patient     Service Modality:  In-person      Visit Activities (Refresh list every visit): [facility] Only    Diagnostic Assessment Date: 1/3/2024  Treatment Plan Review Date: 1/26/2024  See Flowsheets for today's PHQ-9 and SHAHEED-7 results  Previous PHQ-9:       12/18/2023     2:33 PM 2/14/2024     1:23 PM 4/3/2024     2:49 PM   PHQ-9 SCORE   PHQ-9 Total Score MyChart 10 (Moderate depression) 9 (Mild depression) 8 (Mild depression)   PHQ-9 Total Score 10 9 8     Previous SHAHEED-7:       8/16/2022     2:32 PM 12/18/2023     2:34 PM   SHAHEED-7 SCORE   Total Score 6 (mild anxiety) 9 (mild anxiety)   Total Score 6 9       TONE LEVEL:       No data to display                DATA  Extended Session (60+ minutes): No  Interactive Complexity: No  Crisis: No  [facility] Patient: No    Treatment Objective(s) Addressed in This Session:  Target Behavior(s):  depression    Depressed Mood: Increase interest, engagement, and pleasure in doing things  Decrease frequency and intensity of feeling down, depressed, hopeless    Current Stressors / Issues:  Patient reports that he did ask his employer if he can work a later shift. He was told this would not be honored due to structures in scheduling. He continues to have issues with sleep.     Patient states that he's started a new hobby with building legos. He does this with his roommate. Reinforced patient finding something that he enjoys doing and also includes socializing. Patient reflected that this has been a positive for him.    He states that he restarted his dating apps. He processed what this has been like for him and how he's doing it  "differently from before.    [facility] reviewed sleep hygiene tips and encouraged patient's use of the Insomnia  margo.      Progress on Treatment Objective(s) / Homework:  Minimal progress - ACTION (Actively working towards change); Intervened by reinforcing change plan / affirming steps taken    Motivational Interviewing    MI Intervention: Expressed Empathy/Understanding, Supported Autonomy, Collaboration, Evocation, and Open-ended questions     Change Talk Expressed by the Patient: Desire to change Taking steps    Provider Response to Change Talk: E - Evoked more info from patient about behavior change, A - Affirmed patient's thoughts, decisions, or attempts at behavior change, R - Reflected patient's change talk, and S - Summarized patient's change talk statements    Also provided psychoeducation about behavioral health condition, symptoms, and treatment options    Care Plan review completed: No    Medication Review:  No current psychiatric medications prescribed     Medication Compliance:  NA    Changes in Health Issues:   None reported    Chemical Use Review:   Substance Use: Chemical use reviewed, no active concerns identified  .      Tobacco Use: No current tobacco use.       Assessment: Current Emotional / Mental Status (status of significant symptoms):  Risk status (Self / Other harm or suicidal ideation)  Patient has had a history of suicidal ideation: patient reports that starting 4 years ago he would have thoughts of wanting to \"pop out of existence\". He denied any specific thoughts of wanting to die or kill himself and denies a history of suicide attempts, self-injurious behavior, homicidal ideation, homicidal behavior and and other safety concerns  Patient denies current fears or concerns for personal safety.  Patient denies current or recent suicidal ideation or behaviors.  Patient denies current or recent homicidal ideation or behaviors.  Patient denies current or recent self injurious behavior or " ideation.  Patient denies other safety concerns.  A safety and risk management plan has been developed including: talk to his mom or best friend, call 911 and present to the ER          Appearance:   Appropriate   Eye Contact:   Fair   Psychomotor Behavior: Normal   Attitude:   Cooperative  Pleasant  Orientation:   All  Speech   Rate / Production: Normal/ Responsive   Volume:  Normal   Mood:    Euthymic  Affect:    Appropriate   Thought Content:  Clear  Rumination   Thought Form:  Coherent  Circumstantial  Insight:    Fair     Diagnoses:  1. Major depressive disorder, single episode, moderate (H)        Collateral Reports Completed:  Not Applicable    Plan: (Homework, other):  Patient was given information about behavioral services and encouraged to schedule a follow up appointment with the clinic [facility] in 1 month.  He was also given information about mental health symptoms and treatment options  and Cognitive Behavioral Therapy skills to practice when experiencing depression.  CD Recommendations: No indications of CD issues. Patient will consider use of Insomnia  margo.    VICKY Guillermo, [facility]       ______________________________________________________________________                                              Individual Treatment Plan    Patient's Name: Cruzito GOMEZ Case  YOB: 2000    Date of Creation: 1/24/2024  Date Treatment Plan Last Reviewed/Revised: 1/24/2024    DSM5 Diagnoses: 296.22 (F32.1)  Major Depressive Disorder, Single Episode, Moderate With anxious distress  Psychosocial / Contextual Factors: living with a roommate, works full-time, single  PROMIS (reviewed every 90 days): PROMIS 10-Global Health (only subscores and total score):       8/16/2022     2:42 PM 12/18/2023     2:35 PM 4/3/2024     2:50 PM   PROMIS-10 Scores Only   Global Mental Health Score 11 8 13   Global Physical Health Score 17 16 16   PROMIS TOTAL - SUBSCORES 28 24 29        Referral / Collaboration:  Referral  to another professional/service is not indicated at this time..    Anticipated number of session for this episode of care: 9-12 sessions  Anticipation frequency of session: Every other week  Anticipated Duration of each session: 38-52 minutes  Treatment plan will be reviewed in 90 days or when goals have been changed.       MeasurableTreatment Goal(s) related to diagnosis / functional impairment(s)  Goal 1: Patient will effectively reduce depressive symptoms as evidenced by a reduced PHQ9 score of 5 or less with occurrence of several days or less.     I will know I've met my goal when I can be more open-minded about things.      Objective #A (Patient Action)    Patient will Identify negative self-talk and behaviors: challenge core beliefs, myths, and actions  Status: New - Date: 1/24/2024      Intervention(s)  [facility] will  provide psycho-education on cognitive distortions and ways to challenge unhelpful thoughts and use positive affirmations .    Objective #B  Patient will attend and participate in social or recreational activities 1 time a week  Status: New - Date: 1/24/2024      Intervention(s)  [facility] will  help patient identify activities to do and social events to help him create more connection and improve mood .    Patient has reviewed and agreed to the above plan.    Written by  VICKY Guillermo, [facility]

## 2024-05-01 ENCOUNTER — OFFICE VISIT (OUTPATIENT)
Dept: BEHAVIORAL HEALTH | Facility: CLINIC | Age: 24
End: 2024-05-01
Payer: COMMERCIAL

## 2024-05-01 DIAGNOSIS — F32.1 MAJOR DEPRESSIVE DISORDER, SINGLE EPISODE, MODERATE (H): Primary | ICD-10-CM

## 2024-05-01 PROCEDURE — 90834 PSYTX W PT 45 MINUTES: CPT | Performed by: MARRIAGE & FAMILY THERAPIST

## 2024-05-01 NOTE — PROGRESS NOTES
[facility] Primary Care: Integrated Behavioral Health  May 1, 2024      Behavioral Health Clinician Progress Note    Patient Name: Cruzito GOMEZ Case           Service Type:  Individual      Service Location:   Face to Face in Clinic      Session Start Time: 3:05pm  Session End Time: 3:57pm      Session Length: 38 - 52      Attendees: Patient     Service Modality:  In-person      Visit Activities (Refresh list every visit): [facility] [place] Only    Diagnostic Assessment Date: 1/3/2024  Treatment Plan Review Date: 5/1/2024  See Flowsheets for today's PHQ-9 and SHAHEED-7 results  Previous PHQ-9:       12/18/2023     2:33 PM 2/14/2024     1:23 PM 4/3/2024     2:49 PM   PHQ-9 SCORE   PHQ-9 Total Score MyChart 10 (Moderate depression) 9 (Mild depression) 8 (Mild depression)   PHQ-9 Total Score 10 9 8     Previous SHAHEED-7:       8/16/2022     2:32 PM 12/18/2023     2:34 PM   SHAHEED-7 SCORE   Total Score 6 (mild anxiety) 9 (mild anxiety)   Total Score 6 9       TONE LEVEL:       No data to display                DATA  Extended Session (60+ minutes): No  Interactive Complexity: No  Crisis: No  [facility] Patient: No    Treatment Objective(s) Addressed in This Session:  Target Behavior(s):  depression    Depressed Mood: Increase interest, engagement, and pleasure in doing things  Decrease frequency and intensity of feeling down, depressed, hopeless    Current Stressors / Issues:  Patient reports that it's been busy at work. 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 He reflected that he finds that he's more slowed down and he doesn't get to listen to his music while he works. Music helps him typically get through his day.    He states that he has had to make his last paycheck stretch due to where his payday fell in comparison to the 1st of the month.     He continues to struggle with waking up in the morning. He states that he's been trying to go to bed later to help with his sleep quality  "and this has not been as consistent. He reports that he's been now going to bed when he feels tired. He feels that this is better than last visit. Patient states that he's rearranged his room and the light hits his bed in a different spot.     Patient reports that his mood has been \"okay\". He identified feeling a lot of loneliness. Patient talked about sending a text to his mom about noticing that the sun is out and what he sees is nice and he feels a sense of \"empty\". He states that he feels that he's \"running out of time\". Patient identified that he puts pressure on himself because he is not in a relationship. His roommates long-term girlfriend moved in with them a month ago. He states that this has been fine and it's also hard to see sometimes as it's like a reminder that he's not in a relationship. Explored friendships and how he interacts with others. He states that he doesn't make friends at work nor interact much with his co-workers. Patient identified that he doesn't have much satisfaction at work. [facility] reflected that this may be contributing to his lack of interaction as he doesn't want to have further connections at work. He has been considering other jobs and has started to keep his eyes more open. He is interested in working somewhere the he feels more satisfaction.     Reviewed and updated treatment plan and goals.    Progress on Treatment Objective(s) / Homework:  Minimal progress - ACTION (Actively working towards change); Intervened by reinforcing change plan / affirming steps taken    Motivational Interviewing    MI Intervention: Expressed Empathy/Understanding, Supported Autonomy, Collaboration, Evocation, and Open-ended questions     Change Talk Expressed by the Patient: Desire to change Taking steps    Provider Response to Change Talk: E - Evoked more info from patient about behavior change, A - Affirmed patient's thoughts, decisions, or attempts at behavior change, R - Reflected patient's change " "talk, and S - Summarized patient's change talk statements    Also provided psychoeducation about behavioral health condition, symptoms, and treatment options    Care Plan review completed: No    Medication Review:  No current psychiatric medications prescribed     Medication Compliance:  NA    Changes in Health Issues:   None reported    Chemical Use Review:   Substance Use: Chemical use reviewed, no active concerns identified  .      Tobacco Use: No current tobacco use.       Assessment: Current Emotional / Mental Status (status of significant symptoms):  Risk status (Self / Other harm or suicidal ideation)  Patient has had a history of suicidal ideation: patient reports that starting 4 years ago he would have thoughts of wanting to \"pop out of existence\". He denied any specific thoughts of wanting to die or kill himself and denies a history of suicide attempts, self-injurious behavior, homicidal ideation, homicidal behavior and and other safety concerns  Patient denies current fears or concerns for personal safety.  Patient denies current or recent suicidal ideation or behaviors.  Patient denies current or recent homicidal ideation or behaviors.  Patient denies current or recent self injurious behavior or ideation.  Patient denies other safety concerns.  A safety and risk management plan has been developed including: talk to his mom or best friend, call 911 and present to the ER          Appearance:   Appropriate   Eye Contact:   Fair   Psychomotor Behavior: Normal   Attitude:   Cooperative  Pleasant  Orientation:   All  Speech   Rate / Production: Normal/ Responsive   Volume:  Normal   Mood:    Euthymic  Affect:    Appropriate   Thought Content:  Clear  Rumination   Thought Form:  Coherent  Circumstantial  Insight:    Fair     Diagnoses:  1. Major depressive disorder, single episode, moderate (H)        Collateral Reports Completed:  Not Applicable    Plan: (Homework, other):  Patient was given information about " behavioral services and encouraged to schedule a follow up appointment with the clinic [facility] in 1 month.  He was also given information about mental health symptoms and treatment options  and Cognitive Behavioral Therapy skills to practice when experiencing depression.  CD Recommendations: No indications of CD issues.     VICKY Guillermo, [facility]       ______________________________________________________________________                                              Individual Treatment Plan    Patient's Name: Cruzito GOMEZ Case  YOB: 2000    Date of Creation: 1/24/2024  Date Treatment Plan Last Reviewed/Revised: 5/1/2024    DSM5 Diagnoses: 296.22 (F32.1)  Major Depressive Disorder, Single Episode, Moderate With anxious distress  Psychosocial / Contextual Factors: living with a roommate, works full-time, single  PROMIS (reviewed every 90 days): PROMIS 10-Global Health (only subscores and total score):       8/16/2022     2:42 PM 12/18/2023     2:35 PM 4/3/2024     2:50 PM   PROMIS-10 Scores Only   Global Mental Health Score 11 8 13   Global Physical Health Score 17 16 16   PROMIS TOTAL - SUBSCORES 28 24 29        Referral / Collaboration:  Referral to another professional/service is not indicated at this time..    Anticipated number of session for this episode of care: 9-12 sessions  Anticipation frequency of session: Every other week  Anticipated Duration of each session: 38-52 minutes  Treatment plan will be reviewed in 90 days or when goals have been changed.       MeasurableTreatment Goal(s) related to diagnosis / functional impairment(s)  Goal 1: Patient will effectively reduce depressive symptoms as evidenced by a reduced PHQ9 score of 5 or less with occurrence of several days or less.     I will know I've met my goal when I can be more open-minded about things.      Objective #A (Patient Action)    Patient will Identify negative self-talk and behaviors: challenge core beliefs, myths, and  actions  Status: Continued - Date(s): 5/1/2024, improvement noted     Intervention(s)  [facility] will  provide psycho-education on cognitive distortions and ways to challenge unhelpful thoughts and use positive affirmations .    Objective #B  Patient will attend and participate in social or recreational activities 1 time a week  Status: Completed - Date: 5/1/2024      Intervention(s)  [facility] will  help patient identify activities to do and social events to help him create more connection and improve mood .    Objective #C (Patient Action)    Patient will identify 1-2 sleep hygiene practices and patient is not waking up in the middle of the night.  Status: New - Date: 5/1/2024      Intervention(s)  [facility] will  process sleep habits, provide psycho-education on sleep hygiene and ways to incorporate habits .      Patient has reviewed and agreed to the above plan.    Written by  VICKY Guillermo, Behavioral Health Clinician

## 2024-05-29 ENCOUNTER — OFFICE VISIT (OUTPATIENT)
Dept: BEHAVIORAL HEALTH | Facility: CLINIC | Age: 24
End: 2024-05-29
Payer: COMMERCIAL

## 2024-05-29 DIAGNOSIS — F32.1 MAJOR DEPRESSIVE DISORDER, SINGLE EPISODE, MODERATE (H): Primary | ICD-10-CM

## 2024-05-29 PROCEDURE — 90834 PSYTX W PT 45 MINUTES: CPT | Performed by: MARRIAGE & FAMILY THERAPIST

## 2024-05-29 NOTE — PROGRESS NOTES
"[facility] Primary Care: Integrated Behavioral Health  May 29, 2024      Behavioral Health Clinician Progress Note    Patient Name: Cruzito GOMEZ Case           Service Type:  Individual      Service Location:   Face to Face in Clinic      Session Start Time: 3:11pm  Session End Time: 4:00pm      Session Length: 38 - 52      Attendees: Patient     Service Modality:  In-person      Visit Activities (Refresh list every visit): [facility] Only    Diagnostic Assessment Date: 1/3/2024  Treatment Plan Review Date: 5/1/2024  See Flowsheets for today's PHQ-9 and SHAHEED-7 results  Previous PHQ-9:       12/18/2023     2:33 PM 2/14/2024     1:23 PM 4/3/2024     2:49 PM   PHQ-9 SCORE   PHQ-9 Total Score MyChart 10 (Moderate depression) 9 (Mild depression) 8 (Mild depression)   PHQ-9 Total Score 10 9 8     Previous SHAHEED-7:       8/16/2022     2:32 PM 12/18/2023     2:34 PM   SHAHEED-7 SCORE   Total Score 6 (mild anxiety) 9 (mild anxiety)   Total Score 6 9       TONE LEVEL:       No data to display                DATA  Extended Session (60+ minutes): No  Interactive Complexity: No  Crisis: No  [facility] Patient: No    Treatment Objective(s) Addressed in This Session:  Target Behavior(s):  depression    Depressed Mood: Increase interest, engagement, and pleasure in doing things  Decrease frequency and intensity of feeling down, depressed, hopeless    Current Stressors / Issues:  Patient reports that things have been kind of \"rough\" lately. He states that he's looking for a new job. He is applying to [employer]. Explored his reasoning for returning to his previous employer and he identified familiarity as some of it. He states that he left previously due to compensation. He states that with his current job the hours continue to be a barrier for him. He continues to struggle with his sleep schedule and having it work with his job hours.     [facility] reflected patient's unhappiness with his job. Explored options such as other jobs and " "returning to school. Patient reflected on his experience of college and how he had a poor roommate experience and didn't know anyone. He talked about not making new friends because he had friends back at home. He stated that currently he doesn't see a reason to make friends and knows that it will \"just happen\". [facility] reflected that patient seems to believe that people will approach him, rather than him initiating conversation. Encouraged patient to consider her non-verbal body language and whether than opens him up to others or if there's another response.     Writer recalled patient bringing up autism and reflected that patient has some traits of this with black and white thinking and social challenges. Patient stated that it was an ex-girlfriend that said this and he hadn't been previously told this by anyone else. He stated that he is adverse to that diagnosis and doesn't want it because of the label. [facility] validated patient's feelings and discussed how diagnostic clarification can help with better understanding behavior and thought processes that are part of a diagnosis.     Progress on Treatment Objective(s) / Homework:  Minimal progress - ACTION (Actively working towards change); Intervened by reinforcing change plan / affirming steps taken    Motivational Interviewing    MI Intervention: Expressed Empathy/Understanding, Supported Autonomy, Collaboration, Evocation, and Open-ended questions     Change Talk Expressed by the Patient: Desire to change Taking steps    Provider Response to Change Talk: E - Evoked more info from patient about behavior change, A - Affirmed patient's thoughts, decisions, or attempts at behavior change, R - Reflected patient's change talk, and S - Summarized patient's change talk statements    Also provided psychoeducation about behavioral health condition, symptoms, and treatment options    Care Plan review completed: No    Medication Review:  No current psychiatric medications prescribed " "    Medication Compliance:  NA    Changes in Health Issues:   None reported    Chemical Use Review:   Substance Use: Chemical use reviewed, no active concerns identified  .      Tobacco Use: No current tobacco use.       Assessment: Current Emotional / Mental Status (status of significant symptoms):  Risk status (Self / Other harm or suicidal ideation)  Patient has had a history of suicidal ideation: patient reports that starting 4 years ago he would have thoughts of wanting to \"pop out of existence\". He denied any specific thoughts of wanting to die or kill himself and denies a history of suicide attempts, self-injurious behavior, homicidal ideation, homicidal behavior and and other safety concerns  Patient denies current fears or concerns for personal safety.  Patient denies current or recent suicidal ideation or behaviors.  Patient denies current or recent homicidal ideation or behaviors.  Patient denies current or recent self injurious behavior or ideation.  Patient denies other safety concerns.  A safety and risk management plan has been developed including: talk to his mom or best friend, call 911 and present to the ER          Appearance:   Appropriate   Eye Contact:   Fair   Psychomotor Behavior: Restless   Attitude:   Cooperative  Pleasant  Orientation:   All  Speech   Rate / Production: Normal/ Responsive   Volume:  Normal   Mood:    Anxious   Affect:    Appropriate   Thought Content:  Clear  Rumination   Thought Form:  Coherent  Circumstantial  Insight:    Fair     Diagnoses:  1. Major depressive disorder, single episode, moderate (H)      Collateral Reports Completed:  Not Applicable    Plan: (Homework, other):  Patient was given information about behavioral services and encouraged to schedule a follow up appointment with the clinic [facility] in 1 month.  He was also given information about mental health symptoms and treatment options  and Cognitive Behavioral Therapy skills to practice when experiencing " depression.  CD Recommendations: No indications of CD issues. Patient will consider his options for choosing a job. He will reflect on his decision-making process.    VICKY Guillermo, [facility]       ______________________________________________________________________                                              Individual Treatment Plan    Patient's Name: Cruzito GOMEZ Case  YOB: 2000    Date of Creation: 1/24/2024  Date Treatment Plan Last Reviewed/Revised: 5/1/2024    DSM5 Diagnoses: 296.22 (F32.1)  Major Depressive Disorder, Single Episode, Moderate With anxious distress  Psychosocial / Contextual Factors: living with a roommate, works full-time, single  PROMIS (reviewed every 90 days): PROMIS 10-Global Health (only subscores and total score):       8/16/2022     2:42 PM 12/18/2023     2:35 PM 4/3/2024     2:50 PM   PROMIS-10 Scores Only   Global Mental Health Score 11 8 13   Global Physical Health Score 17 16 16   PROMIS TOTAL - SUBSCORES 28 24 29        Referral / Collaboration:  Referral to another professional/service is not indicated at this time..    Anticipated number of session for this episode of care: 9-12 sessions  Anticipation frequency of session: Every other week  Anticipated Duration of each session: 38-52 minutes  Treatment plan will be reviewed in 90 days or when goals have been changed.       MeasurableTreatment Goal(s) related to diagnosis / functional impairment(s)  Goal 1: Patient will effectively reduce depressive symptoms as evidenced by a reduced PHQ9 score of 5 or less with occurrence of several days or less.     I will know I've met my goal when I can be more open-minded about things.      Objective #A (Patient Action)    Patient will Identify negative self-talk and behaviors: challenge core beliefs, myths, and actions  Status: Continued - Date(s): 5/1/2024, improvement noted     Intervention(s)  [facility] will  provide psycho-education on cognitive distortions and ways to  challenge unhelpful thoughts and use positive affirmations .    Objective #B  Patient will attend and participate in social or recreational activities 1 time a week  Status: Completed - Date: 5/1/2024      Intervention(s)  [facility] will  help patient identify activities to do and social events to help him create more connection and improve mood .    Objective #C (Patient Action)    Patient will identify 1-2 sleep hygiene practices and patient is not waking up in the middle of the night.  Status: New - Date: 5/1/2024      Intervention(s)  [facility] will  process sleep habits, provide psycho-education on sleep hygiene and ways to incorporate habits .      Patient has reviewed and agreed to the above plan.    Written by  VICKY Guillermo, Behavioral Health Clinician

## 2024-08-07 ENCOUNTER — OFFICE VISIT (OUTPATIENT)
Dept: BEHAVIORAL HEALTH | Facility: CLINIC | Age: 24
End: 2024-08-07
Payer: COMMERCIAL

## 2024-08-07 DIAGNOSIS — F32.1 MAJOR DEPRESSIVE DISORDER, SINGLE EPISODE, MODERATE (H): Primary | ICD-10-CM

## 2024-08-07 PROCEDURE — 90834 PSYTX W PT 45 MINUTES: CPT | Performed by: MARRIAGE & FAMILY THERAPIST

## 2024-08-07 NOTE — PROGRESS NOTES
[facility] Primary Care: Integrated Behavioral Health  August 7, 2024      Behavioral Health Clinician Progress Note    Patient Name: Cruzito Rodas           Service Type:  Individual      Service Location:   Face to Face in Clinic      Session Start Time: 1:45pm  Session End Time: 2:28pm      Session Length: 38 - 52      Attendees: Patient     Service Modality:  In-person      Visit Activities (Refresh list every visit): [facility] Only    Diagnostic Assessment Date: 1/3/2024  Treatment Plan Review Date: 8/7/2024  See Flowsheets for today's PHQ-9 and SHAHEED-7 results  Previous PHQ-9:       12/18/2023     2:33 PM 2/14/2024     1:23 PM 4/3/2024     2:49 PM   PHQ-9 SCORE   PHQ-9 Total Score MyChart 10 (Moderate depression) 9 (Mild depression) 8 (Mild depression)   PHQ-9 Total Score 10 9 8     Previous SHAHEED-7:       8/16/2022     2:32 PM 12/18/2023     2:34 PM   SHAHEED-7 SCORE   Total Score 6 (mild anxiety) 9 (mild anxiety)   Total Score 6 9       TONE LEVEL:       No data to display                DATA  Extended Session (60+ minutes): No  Interactive Complexity: No  Crisis: No  [facility] Patient: No    Treatment Objective(s) Addressed in This Session:  Target Behavior(s):  depression    Depressed Mood: Increase interest, engagement, and pleasure in doing things  Decrease frequency and intensity of feeling down, depressed, hopeless    Current Stressors / Issues:  Patient reports that he's okay. He reports some financial stress and states he had to ask his dad to help with paying a bill. He states that his paychecks and bill are on different schedules.    Patient states that his schedule changed with work and he now starts at 7am. This just changed two weeks ago. He finds that this is helping his sleep schedule.     Discussed social activities; finances are a barrier, he has been doing things as he can and that is lower cost.     Patient reports that he had a date last week and he is still talking with this  "person. Patient processed thoughts on relationships and hoping to be in a committed relationship. Explored thought process and discussed rigid versus flexible expectations.    Progress on Treatment Objective(s) / Homework:  Minimal progress - ACTION (Actively working towards change); Intervened by reinforcing change plan / affirming steps taken    Motivational Interviewing    MI Intervention: Expressed Empathy/Understanding, Supported Autonomy, Collaboration, Evocation, and Open-ended questions     Change Talk Expressed by the Patient: Desire to change Taking steps    Provider Response to Change Talk: E - Evoked more info from patient about behavior change, A - Affirmed patient's thoughts, decisions, or attempts at behavior change, R - Reflected patient's change talk, and S - Summarized patient's change talk statements    Also provided psychoeducation about behavioral health condition, symptoms, and treatment options    Care Plan review completed: No    Medication Review:  No current psychiatric medications prescribed     Medication Compliance:  NA    Changes in Health Issues:   None reported    Chemical Use Review:   Substance Use: Chemical use reviewed, no active concerns identified  .      Tobacco Use: No current tobacco use.       Assessment: Current Emotional / Mental Status (status of significant symptoms):  Risk status (Self / Other harm or suicidal ideation)  Patient has had a history of suicidal ideation: patient reports that starting 4 years ago he would have thoughts of wanting to \"pop out of existence\". He denied any specific thoughts of wanting to die or kill himself and denies a history of suicide attempts, self-injurious behavior, homicidal ideation, homicidal behavior and and other safety concerns  Patient denies current fears or concerns for personal safety.  Patient denies current or recent suicidal ideation or behaviors.  Patient denies current or recent homicidal ideation or behaviors.  Patient " denies current or recent self injurious behavior or ideation.  Patient denies other safety concerns.  A safety and risk management plan has been developed including: talk to his mom or best friend, call 911 and present to the ER          Appearance:   Appropriate   Eye Contact:   Fair   Psychomotor Behavior: Restless   Attitude:   Cooperative  Friendly Pleasant  Orientation:   All  Speech   Rate / Production: Normal/ Responsive   Volume:  Normal   Mood:    Anxious   Affect:    Appropriate   Thought Content:  Clear  Rumination   Thought Form:  Coherent  Circumstantial  Insight:    Fair     Diagnoses:  1. Major depressive disorder, single episode, moderate (H)        Collateral Reports Completed:  Not Applicable    Plan: (Homework, other):  Patient was given information about behavioral services and encouraged to schedule a follow up appointment with the clinic [facility] in 1 month.  He was also given information about mental health symptoms and treatment options  and Cognitive Behavioral Therapy skills to practice when experiencing depression.  CD Recommendations: No indications of CD issues.     VICKY Guillermo, [facility]       ______________________________________________________________________                                              Individual Treatment Plan    Patient's Name: Cruzito GOMEZ Case  YOB: 2000    Date of Creation: 1/24/2024  Date Treatment Plan Last Reviewed/Revised: 8/7/2024    DSM5 Diagnoses: 296.22 (F32.1)  Major Depressive Disorder, Single Episode, Moderate With anxious distress  Psychosocial / Contextual Factors: living with a roommate, works full-time, single  PROMIS (reviewed every 90 days): PROMIS 10-Global Health (only subscores and total score):       8/16/2022     2:42 PM 12/18/2023     2:35 PM 4/3/2024     2:50 PM   PROMIS-10 Scores Only   Global Mental Health Score 11 8 13   Global Physical Health Score 17 16 16   PROMIS TOTAL - SUBSCORES 28 24 29        Referral /  Collaboration:  Referral to another professional/service is not indicated at this time..    Anticipated number of session for this episode of care: 9-12 sessions  Anticipation frequency of session: Every other week  Anticipated Duration of each session: 38-52 minutes  Treatment plan will be reviewed in 90 days or when goals have been changed.       MeasurableTreatment Goal(s) related to diagnosis / functional impairment(s)  Goal 1: Patient will effectively reduce depressive symptoms as evidenced by a reduced PHQ9 score of 5 or less with occurrence of several days or less.     I will know I've met my goal when I can be more open-minded about things.      Objective #A (Patient Action)    Patient will Identify negative self-talk and behaviors: challenge core beliefs, myths, and actions  Status: Continued - Date(s): 8/7/2024, improvement noted     Intervention(s)  [facility] will  provide psycho-education on cognitive distortions and ways to challenge unhelpful thoughts and use positive affirmations .    Objective #B  Patient will attend and participate in social or recreational activities 1 time a week  Status: Restarted - Date: 8/7/2024      Intervention(s)  [facility] will  help patient identify activities to do and social events to help him create more connection and improve mood .    Objective #C (Patient Action)    Patient will identify 1-2 sleep hygiene practices and patient is not waking up in the middle of the night.  Status: Continued - Date(s): 8/7/2024    Intervention(s)  [facility] will  process sleep habits, provide psycho-education on sleep hygiene and ways to incorporate habits .      Patient has reviewed and agreed to the above plan.    Written by  VICKY Guillermo, Behavioral Health Clinician

## 2024-09-18 ENCOUNTER — OFFICE VISIT (OUTPATIENT)
Dept: BEHAVIORAL HEALTH | Facility: CLINIC | Age: 24
End: 2024-09-18
Payer: COMMERCIAL

## 2024-09-18 DIAGNOSIS — F32.1 MAJOR DEPRESSIVE DISORDER, SINGLE EPISODE, MODERATE (H): Primary | ICD-10-CM

## 2024-09-18 PROCEDURE — 90834 PSYTX W PT 45 MINUTES: CPT | Performed by: MARRIAGE & FAMILY THERAPIST

## 2024-09-18 ASSESSMENT — PATIENT HEALTH QUESTIONNAIRE - PHQ9
SUM OF ALL RESPONSES TO PHQ QUESTIONS 1-9: 7
10. IF YOU CHECKED OFF ANY PROBLEMS, HOW DIFFICULT HAVE THESE PROBLEMS MADE IT FOR YOU TO DO YOUR WORK, TAKE CARE OF THINGS AT HOME, OR GET ALONG WITH OTHER PEOPLE: NOT DIFFICULT AT ALL
SUM OF ALL RESPONSES TO PHQ QUESTIONS 1-9: 7

## 2024-09-18 NOTE — PROGRESS NOTES
[facility] Primary Care: Integrated Behavioral Health  September 18, 2024      Behavioral Health Clinician Progress Note    Patient Name: Cruzito Rodas           Service Type:  Individual      Service Location:   Face to Face in Clinic      Session Start Time: 3:11pm  Session End Time: 4:02pm      Session Length: 38 - 52      Attendees: Patient     Service Modality:  In-person      Visit Activities (Refresh list every visit): [facility] Only    Diagnostic Assessment Date: 1/3/2024  Treatment Plan Review Date: 8/7/2024  See Flowsheets for today's PHQ-9 and SHAHEED-7 results  Previous PHQ-9:       2/14/2024     1:23 PM 4/3/2024     2:49 PM 9/18/2024     2:58 PM   PHQ-9 SCORE   PHQ-9 Total Score MyChart 9 (Mild depression) 8 (Mild depression) 7 (Mild depression)   PHQ-9 Total Score 9 8 7     Previous SHAHEED-7:       8/16/2022     2:32 PM 12/18/2023     2:34 PM   SHAHEED-7 SCORE   Total Score 6 (mild anxiety) 9 (mild anxiety)   Total Score 6 9       TONE LEVEL:       No data to display                DATA  Extended Session (60+ minutes): No  Interactive Complexity: No  Crisis: No  [facility] Patient: No    Treatment Objective(s) Addressed in This Session:  Target Behavior(s):  depression    Depressed Mood: Increase interest, engagement, and pleasure in doing things  Decrease frequency and intensity of feeling down, depressed, hopeless    Current Stressors / Issues:  Patient reports that he's okay today, however the last couple days have felt hard for him. He talked about how he was reflecting on where he was 10 years ago, starting high school. He reflected that it was surprising to him that it doesn't feel like that long ago, however is surprised that it's been 10 years. Patient processed the experience of how old he feels is different than his chronological age.    Sleep schedule: continues to not improve, but is better than when he had his previous work schedule. [facility] encouraged patient to incorporate sleep hygiene  "tips.    Patient reports that dating is on hold right now. He identified that there are times that he's not feeling he has the mental capacity to do this.     Patient state that there are times he feels he's \"wasting time\". Explored options of things he can do. Patient states that he has done this with legos. Encouraged him to consider other options.     Encouraged reflecting on self talk and impact on mood, and triggers for different mood. .    Progress on Treatment Objective(s) / Homework:  Minimal progress - ACTION (Actively working towards change); Intervened by reinforcing change plan / affirming steps taken    Motivational Interviewing    MI Intervention: Expressed Empathy/Understanding, Supported Autonomy, Collaboration, Evocation, and Open-ended questions     Change Talk Expressed by the Patient: Desire to change Taking steps    Provider Response to Change Talk: E - Evoked more info from patient about behavior change, A - Affirmed patient's thoughts, decisions, or attempts at behavior change, R - Reflected patient's change talk, and S - Summarized patient's change talk statements    Also provided psychoeducation about behavioral health condition, symptoms, and treatment options    Care Plan review completed: No    Medication Review:  No current psychiatric medications prescribed     Medication Compliance:  NA    Changes in Health Issues:   None reported    Chemical Use Review:   Substance Use: Chemical use reviewed, no active concerns identified  .      Tobacco Use: No current tobacco use.       Assessment: Current Emotional / Mental Status (status of significant symptoms):  Risk status (Self / Other harm or suicidal ideation)  Patient has had a history of suicidal ideation: patient reports that starting 4 years ago he would have thoughts of wanting to \"pop out of existence\". He denied any specific thoughts of wanting to die or kill himself and denies a history of suicide attempts, self-injurious behavior, " homicidal ideation, homicidal behavior and and other safety concerns  Patient denies current fears or concerns for personal safety.  Patient denies current or recent suicidal ideation or behaviors.  Patient denies current or recent homicidal ideation or behaviors.  Patient denies current or recent self injurious behavior or ideation.  Patient denies other safety concerns.  A safety and risk management plan has been developed including: talk to his mom or best friend, call 911 and present to the ER          Appearance:   Appropriate   Eye Contact:   Fair   Psychomotor Behavior: Restless   Attitude:   Cooperative  Friendly Pleasant  Orientation:   All  Speech   Rate / Production: Normal/ Responsive   Volume:  Loud   Mood:    Anxious   Affect:    Appropriate   Thought Content:  Perseverative Rumination   Thought Form:  Coherent  Circumstantial  Insight:    Fair     Diagnoses:  1. Major depressive disorder, single episode, moderate (H)      Collateral Reports Completed:  Not Applicable    Plan: (Homework, other):  Patient was given information about behavioral services and encouraged to schedule a follow up appointment with the clinic [facility] in 1 month.  He was also given information about mental health symptoms and treatment options  and Cognitive Behavioral Therapy skills to practice when experiencing depression.  CD Recommendations: No indications of CD issues.     VICKY Guillermo, [facility]       ______________________________________________________________________                                              Individual Treatment Plan    Patient's Name: Cruzito GOMEZ Case  YOB: 2000    Date of Creation: 1/24/2024  Date Treatment Plan Last Reviewed/Revised: 8/7/2024    DSM5 Diagnoses: 296.22 (F32.1)  Major Depressive Disorder, Single Episode, Moderate With anxious distress  Psychosocial / Contextual Factors: living with a roommate, works full-time, single  PROMIS (reviewed every 90 days): PROMIS 10-Global  Health (only subscores and total score):       8/16/2022     2:42 PM 12/18/2023     2:35 PM 4/3/2024     2:50 PM 9/18/2024     2:59 PM   PROMIS-10 Scores Only   Global Mental Health Score 11 8 13 11   Global Physical Health Score 17 16 16 17   PROMIS TOTAL - SUBSCORES 28 24 29 28        Referral / Collaboration:  Referral to another professional/service is not indicated at this time..    Anticipated number of session for this episode of care: 9-12 sessions  Anticipation frequency of session: Every other week  Anticipated Duration of each session: 38-52 minutes  Treatment plan will be reviewed in 90 days or when goals have been changed.       MeasurableTreatment Goal(s) related to diagnosis / functional impairment(s)  Goal 1: Patient will effectively reduce depressive symptoms as evidenced by a reduced PHQ9 score of 5 or less with occurrence of several days or less.     I will know I've met my goal when I can be more open-minded about things.      Objective #A (Patient Action)    Patient will Identify negative self-talk and behaviors: challenge core beliefs, myths, and actions  Status: Continued - Date(s): 8/7/2024, improvement noted     Intervention(s)  [facility] will  provide psycho-education on cognitive distortions and ways to challenge unhelpful thoughts and use positive affirmations .    Objective #B  Patient will attend and participate in social or recreational activities 1 time a week  Status: Restarted - Date: 8/7/2024      Intervention(s)  [facility] will  help patient identify activities to do and social events to help him create more connection and improve mood .    Objective #C (Patient Action)    Patient will identify 1-2 sleep hygiene practices and patient is not waking up in the middle of the night.  Status: Continued - Date(s): 8/7/2024    Intervention(s)  [facility] will  process sleep habits, provide psycho-education on sleep hygiene and ways to incorporate habits .      Patient has reviewed and agreed to the above  plan.    Written by  VICKY Guillermo, Behavioral Health Clinician

## 2024-10-30 ENCOUNTER — OFFICE VISIT (OUTPATIENT)
Dept: BEHAVIORAL HEALTH | Facility: CLINIC | Age: 24
End: 2024-10-30
Payer: COMMERCIAL

## 2024-10-30 DIAGNOSIS — F32.1 MAJOR DEPRESSIVE DISORDER, SINGLE EPISODE, MODERATE (H): Primary | ICD-10-CM

## 2024-10-30 PROCEDURE — 90834 PSYTX W PT 45 MINUTES: CPT | Performed by: MARRIAGE & FAMILY THERAPIST

## 2024-10-30 NOTE — PROGRESS NOTES
[facility] Primary Care: Integrated Behavioral Health  October 30 2024      Behavioral Health Clinician Progress Note    Patient Name: Cruzito Rodas           Service Type:  Individual      Service Location:   Face to Face in Clinic      Session Start Time: 3:10pm  Session End Time: 3:58pm      Session Length: 38 - 52      Attendees: Patient     Service Modality:  In-person      Visit Activities (Refresh list every visit): [facility] Only    Diagnostic Assessment Date: 1/3/2024  Treatment Plan Review Date: 8/7/2024  See Flowsheets for today's PHQ-9 and SHAHEED-7 results  Previous PHQ-9:       2/14/2024     1:23 PM 4/3/2024     2:49 PM 9/18/2024     2:58 PM   PHQ-9 SCORE   PHQ-9 Total Score MyChart 9 (Mild depression) 8 (Mild depression) 7 (Mild depression)   PHQ-9 Total Score 9 8 7     Previous SHAHEED-7:       8/16/2022     2:32 PM 12/18/2023     2:34 PM   SHAHEED-7 SCORE   Total Score 6 (mild anxiety) 9 (mild anxiety)   Total Score 6 9       TONE LEVEL:       No data to display                DATA  Extended Session (60+ minutes): No  Interactive Complexity: No  Crisis: No  [facility] Patient: No    Treatment Objective(s) Addressed in This Session:  Target Behavior(s):  depression    Depressed Mood: Increase interest, engagement, and pleasure in doing things  Decrease frequency and intensity of feeling down, depressed, hopeless    Current Stressors / Issues:  Patient reports that it's difficult to wake up lately with the sun rising later. He finds that he wakes before his alarm. He's trying to adjust his sleep schedule. He also reports difficulty with sleep onset.     Patient reports feeling more down in the morning at work for about the first hour of the day. He reflected that he feels tired. Patient identified that he has thought about his childhood and how things were better then and this is partly due to have his dog and his grandma being present. [facility] validated patient's feelings and discussed his thought  "process and explored ways to honor his grandma and dog moving forward or keep their memory alive. Reflected previous discussions of patient feeling things are very much the same and explored patient to consider change and what that would look like.    Reviewed sleep hygiene strategies and cognitive behavioral strategies for insomnia. Patient was engaged in the conversation and is wanting to incorporate the strategies.    Progress on Treatment Objective(s) / Homework:  Minimal progress - ACTION (Actively working towards change); Intervened by reinforcing change plan / affirming steps taken    Motivational Interviewing    MI Intervention: Expressed Empathy/Understanding, Supported Autonomy, Collaboration, Evocation, and Open-ended questions     Change Talk Expressed by the Patient: Desire to change Taking steps    Provider Response to Change Talk: E - Evoked more info from patient about behavior change, A - Affirmed patient's thoughts, decisions, or attempts at behavior change, R - Reflected patient's change talk, and S - Summarized patient's change talk statements    Also provided psychoeducation about behavioral health condition, symptoms, and treatment options    Care Plan review completed: No    Medication Review:  No current psychiatric medications prescribed     Medication Compliance:  NA    Changes in Health Issues:   None reported    Chemical Use Review:   Substance Use: Chemical use reviewed, no active concerns identified  .      Tobacco Use: No current tobacco use.       Assessment: Current Emotional / Mental Status (status of significant symptoms):  Risk status (Self / Other harm or suicidal ideation)  Patient has had a history of suicidal ideation: patient reports that starting 4 years ago he would have thoughts of wanting to \"pop out of existence\". He denied any specific thoughts of wanting to die or kill himself and denies a history of suicide attempts, self-injurious behavior, homicidal ideation, " homicidal behavior and and other safety concerns  Patient denies current fears or concerns for personal safety.  Patient denies current or recent suicidal ideation or behaviors.  Patient denies current or recent homicidal ideation or behaviors.  Patient denies current or recent self injurious behavior or ideation.  Patient denies other safety concerns.  A safety and risk management plan has been developed including: talk to his mom or best friend, call 911 and present to the ER          Appearance:   Appropriate   Eye Contact:   Fair   Psychomotor Behavior: Restless   Attitude:   Cooperative  Friendly Pleasant  Orientation:   All  Speech   Rate / Production: Normal/ Responsive   Volume:  Loud   Mood:    Anxious   Affect:    Appropriate   Thought Content:  Perseverative Rumination   Thought Form:  Coherent  Circumstantial  Insight:    Fair     Diagnoses:  1. Major depressive disorder, single episode, moderate (H)        Collateral Reports Completed:  Not Applicable    Plan: (Homework, other):  Patient was given information about behavioral services and encouraged to schedule a follow up appointment with the clinic [facility] in 1 month.  He was also given information about mental health symptoms and treatment options  and Cognitive Behavioral Therapy skills to practice when experiencing depression.  CD Recommendations: No indications of CD issues. Patient will work on sleep hygiene.     VICKY Guillermo, [facility]       ______________________________________________________________________                                              Individual Treatment Plan    Patient's Name: Cruzito GOMEZ Case  YOB: 2000    Date of Creation: 1/24/2024  Date Treatment Plan Last Reviewed/Revised: 8/7/2024    DSM5 Diagnoses: 296.22 (F32.1)  Major Depressive Disorder, Single Episode, Moderate With anxious distress  Psychosocial / Contextual Factors: living with a roommate, works full-time, single  PROMIS (reviewed every 90 days):  PROMIS 10-Global Health (only subscores and total score):       8/16/2022     2:42 PM 12/18/2023     2:35 PM 4/3/2024     2:50 PM 9/18/2024     2:59 PM   PROMIS-10 Scores Only   Global Mental Health Score 11 8 13 11   Global Physical Health Score 17 16 16 17   PROMIS TOTAL - SUBSCORES 28 24 29 28        Referral / Collaboration:  Referral to another professional/service is not indicated at this time..    Anticipated number of session for this episode of care: 9-12 sessions  Anticipation frequency of session: Every other week  Anticipated Duration of each session: 38-52 minutes  Treatment plan will be reviewed in 90 days or when goals have been changed.       MeasurableTreatment Goal(s) related to diagnosis / functional impairment(s)  Goal 1: Patient will effectively reduce depressive symptoms as evidenced by a reduced PHQ9 score of 5 or less with occurrence of several days or less.     I will know I've met my goal when I can be more open-minded about things.      Objective #A (Patient Action)    Patient will Identify negative self-talk and behaviors: challenge core beliefs, myths, and actions  Status: Continued - Date(s): 8/7/2024, improvement noted     Intervention(s)  [facility] will  provide psycho-education on cognitive distortions and ways to challenge unhelpful thoughts and use positive affirmations .    Objective #B  Patient will attend and participate in social or recreational activities 1 time a week  Status: Restarted - Date: 8/7/2024      Intervention(s)  [facility] will  help patient identify activities to do and social events to help him create more connection and improve mood .    Objective #C (Patient Action)    Patient will identify 1-2 sleep hygiene practices and patient is not waking up in the middle of the night.  Status: Continued - Date(s): 8/7/2024    Intervention(s)  [facility] will  process sleep habits, provide psycho-education on sleep hygiene and ways to incorporate habits .      Patient has reviewed and  agreed to the above plan.    Written by  VICKY Guillermo, Behavioral Health Clinician

## 2024-12-03 ENCOUNTER — OFFICE VISIT (OUTPATIENT)
Dept: FAMILY MEDICINE | Facility: CLINIC | Age: 24
End: 2024-12-03
Payer: COMMERCIAL

## 2024-12-03 VITALS
HEIGHT: 70 IN | DIASTOLIC BLOOD PRESSURE: 78 MMHG | RESPIRATION RATE: 12 BRPM | BODY MASS INDEX: 28.86 KG/M2 | HEART RATE: 88 BPM | WEIGHT: 201.6 LBS | SYSTOLIC BLOOD PRESSURE: 118 MMHG | OXYGEN SATURATION: 99 % | TEMPERATURE: 97.8 F

## 2024-12-03 DIAGNOSIS — J02.9 ACUTE PHARYNGITIS, UNSPECIFIED ETIOLOGY: Primary | ICD-10-CM

## 2024-12-03 LAB
DEPRECATED S PYO AG THROAT QL EIA: NEGATIVE
GROUP A STREP BY PCR: NOT DETECTED
SARS-COV-2 RNA RESP QL NAA+PROBE: NEGATIVE

## 2024-12-03 PROCEDURE — G2211 COMPLEX E/M VISIT ADD ON: HCPCS | Performed by: FAMILY MEDICINE

## 2024-12-03 PROCEDURE — 87651 STREP A DNA AMP PROBE: CPT | Performed by: FAMILY MEDICINE

## 2024-12-03 PROCEDURE — 87635 SARS-COV-2 COVID-19 AMP PRB: CPT | Performed by: FAMILY MEDICINE

## 2024-12-03 PROCEDURE — 99213 OFFICE O/P EST LOW 20 MIN: CPT | Performed by: FAMILY MEDICINE

## 2024-12-03 ASSESSMENT — PAIN SCALES - GENERAL: PAINLEVEL_OUTOF10: MODERATE PAIN (5)

## 2024-12-03 NOTE — PROGRESS NOTES
"  Assessment & Plan     Acute pharyngitis, unspecified etiology  Patient is here because he has had 3 days of sore throat and congestion.  He does not have any about contacts with illness.  He has no fever, body aches or cough.  He has not done any testing at home.  On exam he has some mild erythema posterior oropharynx and he has some clear postnasal drainage.  He has not been vaccinated for COVID or flu.  Patient requested strep test and this was negative.  COVID test was done today but no results yet.  He is encouraged to rest, drink fluids, use Tylenol or ibuprofen as needed for discomfort.  Will notify results of COVID test when available.  Patient will reach out if he has worsening sore throat, new symptoms or concerns about dehydration such as lightheadedness or dizziness.  - Streptococcus A Rapid Screen w/Reflex to PCR - Clinic Collect  - Group A Streptococcus PCR Throat Swab  - COVID-19 Virus (Coronavirus) by PCR Nose      The longitudinal plan of care for the diagnosis(es)/condition(s) as documented were addressed during this visit. Due to the added complexity in care, I will continue to support Jean Claude in the subsequent management and with ongoing continuity of care.      BMI  Estimated body mass index is 29.29 kg/m  as calculated from the following:    Height as of this encounter: 1.767 m (5' 9.57\").    Weight as of this encounter: 91.4 kg (201 lb 9.6 oz).   Weight management plan: Patient was referred to their PCP to discuss a diet and exercise plan.          Subjective   Jean Claude is a 24 year old, presenting for the following health issues:  Pharyngitis        12/3/2024     8:09 AM   Additional Questions   Roomed by Latisha SHANKAR   Accompanied by None         12/3/2024     8:09 AM   Patient Reported Additional Medications   Patient reports taking the following new medications NA     History of Present Illness       Headaches:   Since the patient's last clinic visit, headaches are: no change  The patient is getting " "headaches:  Constant mild headache due to stuffy nose  He is able to do normal daily activities when he has a migraine.  The patient is taking the following rescue/relief medications:  No rescue/relief medications   Patient states \"I get total relief\" from the rescue/relief medications.   The patient is taking the following medications to prevent migraines:  No medications to prevent migraines  In the past 4 weeks, the patient has gone to an Urgent Care or Emergency Room 0 times times due to headaches.    Reason for visit:  Throat  Symptom onset:  1-3 days ago  Symptoms include:  Sore throat, headache, runny nose,  Symptom intensity:  Moderate  Symptom progression:  Staying the same  Had these symptoms before:  Yes  Has tried/received treatment for these symptoms:  Yes  Previous treatment was successful:  Yes  Prior treatment description:  I dont remember  What makes it worse:  Waking up  What makes it better:  Eating warm food,or not swallowing at all   He is taking medications regularly.       Pre-Provider Visit Orders - Rapid Strep  Does the patient have shortness of breath/trouble breathing, an earache, drooling/too much saliva, or any difficulty opening his mouth/moving neck?  No  Does the patient have a sore throat and either history of fever >100.4 in the previous 24 hours without a cough or recent exposure to a known case of strep throat? No      Acute Illness  Acute illness concerns: strep  Onset/Duration: 2 days ago   Symptoms:  Fever: No  Chills/Sweats: No  Headache (location?): YES  Sinus Pressure: No  Conjunctivitis:  No  Ear Pain: no  Rhinorrhea: YES  Congestion: No  Sore Throat: YES  Cough: no  Wheeze: No  Decreased Appetite: YES  Nausea: No  Vomiting: No  Diarrhea: No  Dysuria/Freq.: No  Dysuria or Hematuria: No  Fatigue/Achiness: No  Sick/Strep Exposure: No  Therapies tried and outcome: None    No sick contacts that he is aware of. Trying to eat warm foods. Has been resting.   No pain medication. " "              Objective    /78   Pulse 88   Temp 97.8  F (36.6  C) (Temporal)   Resp 12   Ht 1.767 m (5' 9.57\")   Wt 91.4 kg (201 lb 9.6 oz)   SpO2 99%   BMI 29.29 kg/m    Body mass index is 29.29 kg/m .  Physical Exam   GENERAL: alert and no distress  EYES: Eyes grossly normal to inspection, PERRL and conjunctivae and sclerae normal  HENT: normal cephalic/atraumatic, ear canals and TM's normal, nose and mouth without ulcers or lesions, oral mucous membranes moist, and mild erythema posterior oropharynx  NECK: no adenopathy, no asymmetry, masses, or scars  RESP: lungs clear to auscultation - no rales, rhonchi or wheezes  CV: regular rate and rhythm, normal S1 S2, no S3 or S4, no murmur, click or rub, no peripheral edema  MS: no gross musculoskeletal defects noted, no edema            Signed Electronically by: Aaliyah Allen MD    "

## 2025-02-05 ENCOUNTER — OFFICE VISIT (OUTPATIENT)
Dept: BEHAVIORAL HEALTH | Facility: CLINIC | Age: 25
End: 2025-02-05
Payer: COMMERCIAL

## 2025-02-05 DIAGNOSIS — F32.1 MAJOR DEPRESSIVE DISORDER, SINGLE EPISODE, MODERATE (H): Primary | ICD-10-CM

## 2025-02-05 PROCEDURE — 90834 PSYTX W PT 45 MINUTES: CPT | Performed by: MARRIAGE & FAMILY THERAPIST

## 2025-02-05 ASSESSMENT — PATIENT HEALTH QUESTIONNAIRE - PHQ9
SUM OF ALL RESPONSES TO PHQ QUESTIONS 1-9: 9
10. IF YOU CHECKED OFF ANY PROBLEMS, HOW DIFFICULT HAVE THESE PROBLEMS MADE IT FOR YOU TO DO YOUR WORK, TAKE CARE OF THINGS AT HOME, OR GET ALONG WITH OTHER PEOPLE: NOT DIFFICULT AT ALL
SUM OF ALL RESPONSES TO PHQ QUESTIONS 1-9: 9

## 2025-02-05 NOTE — PROGRESS NOTES
[facility] Primary Care: Integrated Behavioral Health    Behavioral Health Clinician Progress Note   Mental Health & Addiction Services      Wednesday February 5, 2025    Patient Name: Cruzito GOMEZ Case       Service Type:  Individual   Service Location:  Face to Face in Clinic   Visit Start Time: 3:11 PM  Visit End Time:  3:53 pm    Session Length: 38 - 52    Attendees: Patient   Service Modality: In-person   Visit number: 12    [facility] Visit Activities (Refresh list every visit): [facility] Only     [place] Diagnostic Assessment: 1/3/2024  Treatment Plan Review Date: 12/11/2024      DATA:    Extended Session (60+ minutes): No   Interactive Complexity: No   Crisis: No   [facility] Patient: No     Assessments completed prior to visit:   PHQ9:       12/18/2023     2:33 PM 2/14/2024     1:23 PM 4/3/2024     2:49 PM 9/18/2024     2:58 PM 12/3/2024     8:08 AM 12/11/2024     2:43 PM 2/5/2025     2:54 PM   PHQ-9 SCORE   PHQ-9 Total Score MyChart 10 (Moderate depression) 9 (Mild depression) 8 (Mild depression) 7 (Mild depression) 7 (Mild depression) 5 (Mild depression) 9 (Mild depression)   PHQ-9 Total Score 10 9 8 7 7  5  9        Patient-reported     GAD7:       8/16/2022     2:32 PM 12/18/2023     2:34 PM   SHAHEED-7 SCORE   Total Score 6 (mild anxiety) 9 (mild anxiety)   Total Score 6 9     CAGE-AID:       8/16/2022     2:43 PM   CAGE-AID Total Score   Total Score 0   Total Score MyChart 0 (A total score of 2 or greater is considered clinically significant)     PROMIS 10-Global Health (all questions and answers displayed):       8/16/2022     2:42 PM 12/18/2023     2:35 PM 4/3/2024     2:50 PM 9/18/2024     2:59 PM 2/5/2025     2:55 PM   PROMIS 10   In general, would you say your health is: Excellent Excellent Very good Very good Good   In general, would you say your quality of life is: Excellent Very good Very good Very good Very good   In general, how would you rate your physical health? Excellent Excellent  Very good Very good Good   In general, how would you rate your mental health, including your mood and your ability to think? Fair Fair Good Fair Poor   In general, how would you rate your satisfaction with your social activities and relationships? Fair Poor Fair Fair Good   In general, please rate how well you carry out your usual social activities and roles Excellent Very good Very good Good Good   To what extent are you able to carry out your everyday physical activities such as walking, climbing stairs, carrying groceries, or moving a chair? Completely Completely Completely Completely Completely   In the past 7 days, how often have you been bothered by emotional problems such as feeling anxious, depressed, or irritable? Often Always Rarely Sometimes Always   In the past 7 days, how would you rate your fatigue on average? Mild Moderate Moderate Mild Moderate   In the past 7 days, how would you rate your pain on average, where 0 means no pain, and 10 means worst imaginable pain? 6 5 1 3 7   In general, would you say your health is: 5 5 4 4 3   In general, would you say your quality of life is: 5 4 4 4 4   In general, how would you rate your physical health? 5 5 4 4 3   In general, how would you rate your mental health, including your mood and your ability to think? 2 2 3 2 1   In general, how would you rate your satisfaction with your social activities and relationships? 2 1 2 2 3   In general, please rate how well you carry out your usual social activities and roles. (This includes activities at home, at work and in your community, and responsibilities as a parent, child, spouse, employee, friend, etc.) 5 4 4 3 3   To what extent are you able to carry out your everyday physical activities such as walking, climbing stairs, carrying groceries, or moving a chair? 5 5 5 5 5   In the past 7 days, how often have you been bothered by emotional problems such as feeling anxious, depressed, or irritable? 4 5 2 3 5   In the  "past 7 days, how would you rate your fatigue on average? 2 3 3 2 3   In the past 7 days, how would you rate your pain on average, where 0 means no pain, and 10 means worst imaginable pain? 6 5 1 3 7   Global Mental Health Score 11 8 13 11 9    Global Physical Health Score 17 16 16 17 13    PROMIS TOTAL - SUBSCORES 28 24 29 28 22        Patient-reported     [place] Suicide Severity Rating Scale (Lifetime/Recent)      8/16/2022     3:27 PM 12/18/2023     5:34 PM   [place] Suicide Severity Rating (Lifetime/Recent)   1. Wish to be Dead (Lifetime) Y Y   Wish to be Dead Description (Lifetime) He states that starting about 3 years ago he wished he could \"pop out of existence\". He states that this would mostly occur at work and it would be to avoid what is going on. Starting about 5 years ago, he states that he experienced thoughts of wishing to \"pop out of existence\".   1. Wish to be Dead (Past 1 Month) Y N   Wish to be Dead Description (Past 1 Month) Patient identified thoughts of wishing he would \"pop out of existence\"    2. Non-Specific Active Suicidal Thoughts (Lifetime) N N   Most Severe Ideation Rating (Lifetime) 1 2   Most Severe Ideation Rating (Past 1 Month) 1 --   Frequency (Lifetime) 2 1   Frequency (Past 1 Month) 1 --   Duration (Lifetime) 1 1   Duration (Past 1 Month) 1 --   Controllability (Lifetime) 1 1   Controllability (Past 1 Month) 1 --   Deterrents (Lifetime) 1 1   Deterrents (Past 1 Month) 1 --   Reasons for Ideation (Lifetime) 4 4   Reasons for Ideation (Past 1 Month) 4 0   Actual Attempt (Lifetime) N N   Has subject engaged in non-suicidal self-injurious behavior? (Lifetime) N N   Interrupted Attempts (Lifetime) N N   Aborted or Self-Interrupted Attempt (Lifetime) N N   Preparatory Acts or Behavior (Lifetime) N N   Calculated C-SSRS Risk Score (Lifetime/Recent) Low Risk No Risk Indicated       Reason for Visit/Presenting Concern:  Depression and sleep concerns    Current Stressors / Issues: " "  Patient reports that he's \"been better\". He states that he cut out soda for the month of January in hopes of losing weight. He was able to stop drinking soda, however his weight did not decrease. He's working on going to the gym about 3 times a week, and this started last week. He reflected that in the past his motivation to exercise was to attract women and this time he's doing this for himself.    Patient had forgotten about the paperwork about autism he came across. 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 He reflected that he is getting information from social media and he understands that this feeds some of his \"dread\". He identified that it would be helpful to limit what he sees and how much time he spends on it.       Therapeutic Interventions:  Motivational Interviewing (MI): Validated patient's thoughts, feelings and experience. Asked open-ended questions to invite patient's self-reflection and self-direction around change and what is important for them in working towards their goals.  Affirmed patient's strengths and abilities.  Cognitive Behavioral Therapy (CBT): Identified and challenged maladaptive patterns of behavior and thinking that interfere with patients life and Assisted patient in developing coping strategies (e.g. more physical exercise, less internal focus, increase social involvement, more assertiveness, etc.).      Response to treatment interventions:   Patient was receptive to interventions utilized.  Patient was engaged in the therapy process.       Progress on Treatment Objective(s) / Homework:   Minimal progress - ACTION (Actively working towards change); Intervened by reinforcing change plan / affirming steps taken     Medication Review:   No current psychiatric medications prescribed     Medication Compliance:   NA     Chemical Use Review:  Substance Use: Chemical use reviewed, no active concerns identified  " "    Tobacco Use: No current tobacco use.       Assessment: Current Emotional / Mental Status (status of significant symptoms):    Risk status (Self / Other harm or suicidal ideation)   Patient has had a history of suicidal ideation: 5 years ago had thoughts of wanting to \"pop out of existence.\" He denied any specific thoughts of wanting to die or kill himself and denies a history of suicide attempts, self-injurious behavior, homicidal ideation, homicidal behavior, and and other safety concerns   Patient denies current fears or concerns for personal safety.   Patient denies current or recent suicidal ideation or behaviors.   Patient denies current or recent homicidal ideation or behaviors.   Patient denies current or recent self injurious behavior or ideation.   Patient denies other safety concerns.   A formal safety and risk management plan has been developed including: patient      ASSESSMENT:   Mental Status:     Appearance:   Appropriate    Eye Contact:   Fair    Psychomotor Behavior: Restless    Attitude:   Cooperative  Pleasant   Orientation:   All   Speech Rate / Production: Normal    Volume:   Normal    Mood:    Less depressed    Affect:    Appropriate    Thought Content:  Perseverative Rumination    Thought Form:  Coherent  Logical    Insight:    Fair          Diagnoses:   Major depressive disorder, single episode, moderate (H)    Collateral Reports Completed:   Not Applicable       Plan: (Homework, other):   Patient was provided CD Recommendations: No indications of CD issues  Patient was given information about behavioral services and encouraged to schedule a follow up appointment with the clinic [facility] in 1 month.    Patient will limit or stop drinking soda. He will identify a new activity to engage in.      VICKY Guillermo, [facility]       ______________________________________________________________________                                              Individual Treatment Plan    Patient's Name: Cruzito GOMEZ " Case  YOB: 2000    Date of Creation: 1/24/2024  Date Treatment Plan Last Reviewed/Revised: 12/11/2024    DSM5 Diagnoses: 296.22 (F32.1)  Major Depressive Disorder, Single Episode, Moderate With anxious distress  Psychosocial / Contextual Factors: living with a roommate, works full-time, single  PROMIS (reviewed every 90 days): PROMIS 10-Global Health (only subscores and total score):       8/16/2022     2:42 PM 12/18/2023     2:35 PM 4/3/2024     2:50 PM 9/18/2024     2:59 PM 2/5/2025     2:55 PM   PROMIS-10 Scores Only   Global Mental Health Score 11 8 13 11 9    Global Physical Health Score 17 16 16 17 13    PROMIS TOTAL - SUBSCORES 28 24 29 28 22        Patient-reported        Referral / Collaboration:  Referral to another professional/service is not indicated at this time..    Anticipated number of session for this episode of care: 9-12 sessions  Anticipation frequency of session: Monthly  Anticipated Duration of each session: 38-52 minutes  Treatment plan will be reviewed in 90 days or when goals have been changed.       MeasurableTreatment Goal(s) related to diagnosis / functional impairment(s)  Goal 1: Patient will effectively reduce depressive symptoms as evidenced by improved PHQ9 score and increased engagement of activities.    I will know I've met my goal when I can be more open-minded about things.      Objective #A (Patient Action)    Patient will Identify negative self-talk and behaviors: challenge core beliefs, myths, and actions.   Status: Continued - Date(s): 12/11/2024       Intervention(s)  [facility] will  provide psycho-education on cognitive distortions and ways to challenge unhelpful thoughts and use positive affirmations . Help patient create a list of activities to do and identify barriers.     Objective #B  Patient will identify activities to engage in and start to do different things.  Status: New - Date: 12/11/2024      Intervention(s)  [facility] will  help patient identify activities  to do and social events to help him create more connection and improve mood .    Objective #C (Patient Action)    Patient will identify 1-2 sleep hygiene practices and patient is not waking up in the middle of the night.  Status: Continued - Date(s): 12/11/2024    Intervention(s)  [facility] will  process sleep habits, provide psycho-education on sleep hygiene and ways to incorporate habits .      Patient has reviewed and agreed to the above plan.    Written by  VICKY Guillermo, Behavioral Health Clinician

## 2025-04-30 ENCOUNTER — OFFICE VISIT (OUTPATIENT)
Dept: BEHAVIORAL HEALTH | Facility: CLINIC | Age: 25
End: 2025-04-30
Payer: COMMERCIAL

## 2025-04-30 DIAGNOSIS — F32.1 MAJOR DEPRESSIVE DISORDER, SINGLE EPISODE, MODERATE (H): Primary | ICD-10-CM

## 2025-04-30 PROCEDURE — 90834 PSYTX W PT 45 MINUTES: CPT | Performed by: MARRIAGE & FAMILY THERAPIST

## 2025-04-30 NOTE — PROGRESS NOTES
[facility] Primary [facility]: Integrated Behavioral Health    Behavioral Health Clinician Progress Note   Mental Health & Addiction Services      Wednesday, April 30, 2025    Patient Name: Cruzito GOMEZ Case       Service Type:  Individual   Service Location:  Face to Face in Clinic   Visit Start Time: 3:14 PM  Visit End Time:  4:00 pm    Session Length: 38 - 52    Attendees: Patient   Service Modality: In-person   Visit number: 14    [facility] Visit Activities (Refresh list every visit): [facility] Only     [place] Diagnostic Assessment: 1/3/2024  Treatment Plan Review Date: 3/12/2025    DATA:    Extended Session (60+ minutes): No   Interactive Complexity: No   Crisis: No   [facility] Patient: No     Assessments completed prior to visit:   PHQ9:       2/14/2024     1:23 PM 4/3/2024     2:49 PM 9/18/2024     2:58 PM 12/3/2024     8:08 AM 12/11/2024     2:43 PM 2/5/2025     2:54 PM 3/12/2025     2:51 PM   PHQ-9 SCORE   PHQ-9 Total Score MyChart 9 (Mild depression) 8 (Mild depression) 7 (Mild depression) 7 (Mild depression) 5 (Mild depression) 9 (Mild depression) 8 (Mild depression)   PHQ-9 Total Score 9 8 7 7  5  9  8        Patient-reported     GAD7:       8/16/2022     2:32 PM 12/18/2023     2:34 PM   SHAHEED-7 SCORE   Total Score 6 (mild anxiety) 9 (mild anxiety)   Total Score 6 9     CAGE-AID:       8/16/2022     2:43 PM   CAGE-AID Total Score   Total Score 0   Total Score MyChart 0 (A total score of 2 or greater is considered clinically significant)     PROMIS 10-Global Health (all questions and answers displayed):       8/16/2022     2:42 PM 12/18/2023     2:35 PM 4/3/2024     2:50 PM 9/18/2024     2:59 PM 2/5/2025     2:55 PM   PROMIS 10   In general, would you say your health is: Excellent Excellent Very good Very good Good   In general, would you say your quality of life is: Excellent Very good Very good Very good Very good   In general, how would you rate your physical health? Excellent Excellent Very good  Very good Good   In general, how would you rate your mental health, including your mood and your ability to think? Fair Fair Good Fair Poor   In general, how would you rate your satisfaction with your social activities and relationships? Fair Poor Fair Fair Good   In general, please rate how well you carry out your usual social activities and roles Excellent Very good Very good Good Good   To what extent are you able to carry out your everyday physical activities such as walking, climbing stairs, carrying groceries, or moving a chair? Completely Completely Completely Completely Completely   In the past 7 days, how often have you been bothered by emotional problems such as feeling anxious, depressed, or irritable? Often Always Rarely Sometimes Always   In the past 7 days, how would you rate your fatigue on average? Mild Moderate Moderate Mild Moderate   In the past 7 days, how would you rate your pain on average, where 0 means no pain, and 10 means worst imaginable pain? 6 5 1 3 7   In general, would you say your health is: 5 5 4 4 3   In general, would you say your quality of life is: 5 4 4 4 4   In general, how would you rate your physical health? 5 5 4 4 3   In general, how would you rate your mental health, including your mood and your ability to think? 2 2 3 2 1   In general, how would you rate your satisfaction with your social activities and relationships? 2 1 2 2 3   In general, please rate how well you carry out your usual social activities and roles. (This includes activities at home, at work and in your community, and responsibilities as a parent, child, spouse, employee, friend, etc.) 5 4 4 3 3   To what extent are you able to carry out your everyday physical activities such as walking, climbing stairs, carrying groceries, or moving a chair? 5 5 5 5 5   In the past 7 days, how often have you been bothered by emotional problems such as feeling anxious, depressed, or irritable? 4 5 2 3 5   In the past 7  "days, how would you rate your fatigue on average? 2 3 3 2 3   In the past 7 days, how would you rate your pain on average, where 0 means no pain, and 10 means worst imaginable pain? 6 5 1 3 7   Global Mental Health Score 11 8 13 11 9    Global Physical Health Score 17 16 16 17 13    PROMIS TOTAL - SUBSCORES 28 24 29 28 22        Patient-reported     [place] Suicide Severity Rating Scale (Lifetime/Recent)      8/16/2022     3:27 PM 12/18/2023     5:34 PM   [place] Suicide Severity Rating (Lifetime/Recent)   1. Wish to be Dead (Lifetime) Y Y   Wish to be Dead Description (Lifetime) He states that starting about 3 years ago he wished he could \"pop out of existence\". He states that this would mostly occur at work and it would be to avoid what is going on. Starting about 5 years ago, he states that he experienced thoughts of wishing to \"pop out of existence\".   1. Wish to be Dead (Past 1 Month) Y N   Wish to be Dead Description (Past 1 Month) Patient identified thoughts of wishing he would \"pop out of existence\"    2. Non-Specific Active Suicidal Thoughts (Lifetime) N N   Most Severe Ideation Rating (Lifetime) 1 2   Most Severe Ideation Rating (Past 1 Month) 1 --   Frequency (Lifetime) 2 1   Frequency (Past 1 Month) 1 --   Duration (Lifetime) 1 1   Duration (Past 1 Month) 1 --   Controllability (Lifetime) 1 1   Controllability (Past 1 Month) 1 --   Deterrents (Lifetime) 1 1   Deterrents (Past 1 Month) 1 --   Reasons for Ideation (Lifetime) 4 4   Reasons for Ideation (Past 1 Month) 4 0   Actual Attempt (Lifetime) N N   Has subject engaged in non-suicidal self-injurious behavior? (Lifetime) N N   Interrupted Attempts (Lifetime) N N   Aborted or Self-Interrupted Attempt (Lifetime) N N   Preparatory Acts or Behavior (Lifetime) N N   Calculated C-SSRS Risk Score (Lifetime/Recent) Low Risk No Risk Indicated       Reason for Visit/Presenting Concern:  Depression    Current Stressors / Issues:   Patient identified that current " "events are what is stressful to him currently. He states that he's trying to \"not think about it\". He is also trying to go out and ride his bike more regularly. He hasn't been going to the gym because he doesn't find it enjoyable.     Sleep has reportedly been more \"steady\". He states that he's feeling more \"refreshed\" when he wakes. He states that the light in the morning is helping. He's considering unplugging his LED lights prior to going to sleep to help with sleep maintenance.      Patient reports that he's been thinking about autism with more curiosity around the symptoms. He hasn't confronted his parents about the document he found. Writer encouraged him to think about what stops him from bringing it up.      Therapeutic Interventions:  Motivational Interviewing (MI): Validated patient's thoughts, feelings and experience. Asked open-ended questions to invite patient's self-reflection and self-direction around change and what is important for them in working towards their goals.  Affirmed patient's strengths and abilities.  Cognitive Behavioral Therapy (CBT): Identified and challenged maladaptive patterns of behavior and thinking that interfere with patients life, Assisted patient in developing coping strategies (e.g. more physical exercise, less internal focus, increase social involvement, more assertiveness, etc.)., and Reinforced successes reported by patient since last appointment.  Psycho-education: Provided psycho-education about patient's behavioral health condition and symptoms.      Response to treatment interventions:   Patient was receptive to interventions utilized.  Patient was engaged in the therapy process.       Progress on Treatment Objective(s) / Homework:   Satisfactory progress - ACTION (Actively working towards change); Intervened by reinforcing change plan / affirming steps taken     Medication Review:   No current psychiatric medications prescribed     Medication Compliance:   NA " "    Chemical Use Review:  Substance Use: Chemical use reviewed, no active concerns identified      Tobacco Use: No current tobacco use.       Assessment: Current Emotional / Mental Status (status of significant symptoms):    Risk status (Self / Other harm or suicidal ideation)   Patient has had a history of suicidal ideation: 5 years ago had thoughts of wanting to \"pop out of existence.\" He denied any specific thoughts of wanting to die or kill himself and denies a history of suicide attempts, self-injurious behavior, homicidal ideation, homicidal behavior, and and other safety concerns   Patient denies current fears or concerns for personal safety.   Patient denies current or recent suicidal ideation or behaviors.   Patient denies current or recent homicidal ideation or behaviors.   Patient denies current or recent self injurious behavior or ideation.   Patient denies other safety concerns.   A formal safety and risk management plan has been developed including: patient      ASSESSMENT:   Mental Status:     Appearance:   Appropriate    Eye Contact:   Fair    Psychomotor Behavior: Restless    Attitude:   Cooperative  Pleasant   Orientation:   All   Speech Rate / Production: Normal    Volume:   Normal    Mood:    Less depressed    Affect:    Appropriate    Thought Content:  Perseverative Rumination    Thought Form:  Coherent  Logical    Insight:    Fair          Diagnoses:   Major depressive disorder, single episode, moderate (H)    Collateral Reports Completed:   Not Applicable       Plan: (Homework, other):   Patient was provided CD Recommendations: No indications of CD issues  Patient was given information about behavioral services and encouraged to schedule a follow up appointment with the clinic [facility] in 1 month.    Patient will engage in physical activity regularly. He will consider talking with his parents around their consideration of autism testing.      VICKY Guillermo, " [facility]       ______________________________________________________________________                                              Individual Treatment Plan    Patient's Name: Cruzito GOMEZ Case  YOB: 2000    Date of Creation: 1/24/2024  Date Treatment Plan Last Reviewed/Revised: 3/12/2025    DSM5 Diagnoses: 296.22 (F32.1)  Major Depressive Disorder, Single Episode, Moderate With anxious distress  Psychosocial / Contextual Factors: living with a roommate, works full-time, single  PROMIS (reviewed every 90 days): PROMIS 10-Global Health (only subscores and total score):       8/16/2022     2:42 PM 12/18/2023     2:35 PM 4/3/2024     2:50 PM 9/18/2024     2:59 PM 2/5/2025     2:55 PM   PROMIS-10 Scores Only   Global Mental Health Score 11 8 13 11 9    Global Physical Health Score 17 16 16 17 13    PROMIS TOTAL - SUBSCORES 28 24 29 28 22        Patient-reported        Referral / Collaboration:  Referral to another professional/service is not indicated at this time..    Anticipated number of session for this episode of care: 9-12 sessions  Anticipation frequency of session: Monthly  Anticipated Duration of each session: 38-52 minutes  Treatment plan will be reviewed in 90 days or when goals have been changed.       MeasurableTreatment Goal(s) related to diagnosis / functional impairment(s)  Goal 1: Patient will effectively reduce depressive symptoms as evidenced by improved PHQ9 score and increased engagement of activities.    I will know I've met my goal when I can be more open-minded about things.      Objective #A (Patient Action)    Patient will Identify negative self-talk and behaviors: challenge core beliefs, myths, and actions.   Status: Continued - Date(s): 3/12/2025       Intervention(s)  [facility] will  provide psycho-education on cognitive distortions and ways to challenge unhelpful thoughts and use positive affirmations . Help patient create a list of activities to do and identify barriers.     Objective  #B  Patient will identify activities to engage in and start to do different things.  Status: New - Date: 3/12/2025      Intervention(s)  [facility] will  help patient identify activities to do and social events to help him create more connection and improve mood .    Objective #C (Patient Action)    Patient will identify 1-2 sleep hygiene practices and patient is not waking up in the middle of the night.  Status: Continued - Date(s): 3/12/2025    Intervention(s)  [facility] will  process sleep habits, provide psycho-education on sleep hygiene and ways to incorporate habits .      Patient has reviewed and agreed to the above plan.    Written by  VICKY Guillermo, Behavioral Health Clinician

## 2025-06-04 ENCOUNTER — OFFICE VISIT (OUTPATIENT)
Dept: BEHAVIORAL HEALTH | Facility: CLINIC | Age: 25
End: 2025-06-04
Payer: COMMERCIAL

## 2025-06-04 DIAGNOSIS — F32.1 MAJOR DEPRESSIVE DISORDER, SINGLE EPISODE, MODERATE (H): Primary | ICD-10-CM

## 2025-06-04 PROCEDURE — 90834 PSYTX W PT 45 MINUTES: CPT | Performed by: MARRIAGE & FAMILY THERAPIST

## 2025-06-04 NOTE — PROGRESS NOTES
[facility] Primary Care: Integrated Behavioral Health    Behavioral Health Clinician Progress Note   Mental Health & Addiction Services      Wednesday, June 4, 2025    Patient Name: Cruzito GOMEZ Case       Service Type:  Individual   Service Location:  Face to Face in Clinic   Visit Start Time: 2:04 PM  Visit End Time:  2:55 pm   Session Length: 38 - 52    Attendees: Patient   Service Modality: In-person   Visit number: 15    [facility] Visit Activities (Refresh list every visit): [facility] Only     [place] Diagnostic Assessment: 1/3/2024  Treatment Plan Review Date: 3/12/2025    DATA:    Extended Session (60+ minutes): No   Interactive Complexity: No   Crisis: No   [facility] Patient: No     Assessments completed prior to visit:   PHQ9:       2/14/2024     1:23 PM 4/3/2024     2:49 PM 9/18/2024     2:58 PM 12/3/2024     8:08 AM 12/11/2024     2:43 PM 2/5/2025     2:54 PM 3/12/2025     2:51 PM   PHQ-9 SCORE   PHQ-9 Total Score MyChart 9 (Mild depression) 8 (Mild depression) 7 (Mild depression) 7 (Mild depression) 5 (Mild depression) 9 (Mild depression) 8 (Mild depression)   PHQ-9 Total Score 9 8 7 7  5  9  8        Patient-reported     GAD7:       8/16/2022     2:32 PM 12/18/2023     2:34 PM   SHAHEED-7 SCORE   Total Score 6 (mild anxiety) 9 (mild anxiety)   Total Score 6 9     CAGE-AID:       8/16/2022     2:43 PM   CAGE-AID Total Score   Total Score 0   Total Score MyChart 0 (A total score of 2 or greater is considered clinically significant)     PROMIS 10-Global Health (all questions and answers displayed):       8/16/2022     2:42 PM 12/18/2023     2:35 PM 4/3/2024     2:50 PM 9/18/2024     2:59 PM 2/5/2025     2:55 PM   PROMIS 10   In general, would you say your health is: Excellent Excellent Very good Very good Good   In general, would you say your quality of life is: Excellent Very good Very good Very good Very good   In general, how would you rate your physical health? Excellent Excellent Very good  Very good Good   In general, how would you rate your mental health, including your mood and your ability to think? Fair Fair Good Fair Poor   In general, how would you rate your satisfaction with your social activities and relationships? Fair Poor Fair Fair Good   In general, please rate how well you carry out your usual social activities and roles Excellent Very good Very good Good Good   To what extent are you able to carry out your everyday physical activities such as walking, climbing stairs, carrying groceries, or moving a chair? Completely Completely Completely Completely Completely   In the past 7 days, how often have you been bothered by emotional problems such as feeling anxious, depressed, or irritable? Often Always Rarely Sometimes Always   In the past 7 days, how would you rate your fatigue on average? Mild Moderate Moderate Mild Moderate   In the past 7 days, how would you rate your pain on average, where 0 means no pain, and 10 means worst imaginable pain? 6 5 1 3 7   In general, would you say your health is: 5 5 4 4 3   In general, would you say your quality of life is: 5 4 4 4 4   In general, how would you rate your physical health? 5 5 4 4 3   In general, how would you rate your mental health, including your mood and your ability to think? 2 2 3 2 1   In general, how would you rate your satisfaction with your social activities and relationships? 2 1 2 2 3   In general, please rate how well you carry out your usual social activities and roles. (This includes activities at home, at work and in your community, and responsibilities as a parent, child, spouse, employee, friend, etc.) 5 4 4 3 3   To what extent are you able to carry out your everyday physical activities such as walking, climbing stairs, carrying groceries, or moving a chair? 5 5 5 5 5   In the past 7 days, how often have you been bothered by emotional problems such as feeling anxious, depressed, or irritable? 4 5 2 3 5   In the past 7  "days, how would you rate your fatigue on average? 2 3 3 2 3   In the past 7 days, how would you rate your pain on average, where 0 means no pain, and 10 means worst imaginable pain? 6 5 1 3 7   Global Mental Health Score 11 8 13 11 9    Global Physical Health Score 17 16 16 17 13    PROMIS TOTAL - SUBSCORES 28 24 29 28 22        Patient-reported     [place] Suicide Severity Rating Scale (Lifetime/Recent)      8/16/2022     3:27 PM 12/18/2023     5:34 PM   [place] Suicide Severity Rating (Lifetime/Recent)   1. Wish to be Dead (Lifetime) Y Y   Wish to be Dead Description (Lifetime) He states that starting about 3 years ago he wished he could \"pop out of existence\". He states that this would mostly occur at work and it would be to avoid what is going on. Starting about 5 years ago, he states that he experienced thoughts of wishing to \"pop out of existence\".   1. Wish to be Dead (Past 1 Month) Y N   Wish to be Dead Description (Past 1 Month) Patient identified thoughts of wishing he would \"pop out of existence\"    2. Non-Specific Active Suicidal Thoughts (Lifetime) N N   Most Severe Ideation Rating (Lifetime) 1 2   Most Severe Ideation Rating (Past 1 Month) 1 --   Frequency (Lifetime) 2 1   Frequency (Past 1 Month) 1 --   Duration (Lifetime) 1 1   Duration (Past 1 Month) 1 --   Controllability (Lifetime) 1 1   Controllability (Past 1 Month) 1 --   Deterrents (Lifetime) 1 1   Deterrents (Past 1 Month) 1 --   Reasons for Ideation (Lifetime) 4 4   Reasons for Ideation (Past 1 Month) 4 0   Actual Attempt (Lifetime) N N   Has subject engaged in non-suicidal self-injurious behavior? (Lifetime) N N   Interrupted Attempts (Lifetime) N N   Aborted or Self-Interrupted Attempt (Lifetime) N N   Preparatory Acts or Behavior (Lifetime) N N   Calculated C-SSRS Risk Score (Lifetime/Recent) Low Risk No Risk Indicated       Reason for Visit/Presenting Concern:  Depression    Current Stressors / Issues:   Patient reports that things " "have been pretty good. He's looking forward to the new [employer] Switch coming out and he's going to that tonight. He identified that his financial situation is his biggest stressor.     Patient processed his thoughts regarding dating. He reflected that it hasn't really been crossing his mind.     Patient reflected thoughts regarding Autism and curiosity around symptoms and diagnosis. Patient discussed his thought process and interactions with others. He reflected that he's been told he's often blunt in conversation. He is interested to review symptoms more thoroughly in the next visit.    Therapeutic Interventions:  Motivational Interviewing (MI): Validated patient's thoughts, feelings and experience. Asked open-ended questions to invite patient's self-reflection and self-direction around change and what is important for them in working towards their goals.  Affirmed patient's strengths and abilities.  Psycho-education: Provided psycho-education about patient's behavioral health condition and symptoms.      Response to treatment interventions:   Patient was receptive to interventions utilized.  Patient was engaged in the therapy process.       Progress on Treatment Objective(s) / Homework:   Satisfactory progress - ACTION (Actively working towards change); Intervened by reinforcing change plan / affirming steps taken     Medication Review:   No current psychiatric medications prescribed     Medication Compliance:   NA     Chemical Use Review:  Substance Use: Chemical use reviewed, no active concerns identified      Tobacco Use: No current tobacco use.       Assessment: Current Emotional / Mental Status (status of significant symptoms):    Risk status (Self / Other harm or suicidal ideation)   Patient has had a history of suicidal ideation: 5 years ago had thoughts of wanting to \"pop out of existence.\" He denied any specific thoughts of wanting to die or kill himself and denies a history of suicide attempts, " self-injurious behavior, homicidal ideation, homicidal behavior, and and other safety concerns   Patient denies current fears or concerns for personal safety.   Patient denies current or recent suicidal ideation or behaviors.   Patient denies current or recent homicidal ideation or behaviors.   Patient denies current or recent self injurious behavior or ideation.   Patient denies other safety concerns.   A formal safety and risk management plan has been developed including: patient      ASSESSMENT:   Mental Status:     Appearance:   Appropriate    Eye Contact:   Fair    Psychomotor Behavior: Restless    Attitude:   Cooperative  Pleasant   Orientation:   All   Speech Rate / Production: Normal    Volume:   Normal    Mood:    Less depressed   Affect:    Appropriate    Thought Content:  Perseverative Rumination    Thought Form:  Coherent  Logical    Insight:    Fair          Diagnoses:   Major depressive disorder, single episode, moderate (H)    Collateral Reports Completed:   Not Applicable       Plan: (Homework, other):   Patient was provided CD Recommendations: No indications of CD issues  Patient was given information about behavioral services and encouraged to schedule a follow up appointment with the clinic [facility] in 1 month.    Patient will engage in physical activity regularly. He will consider talking with his parents around their consideration of autism testing.      VICKY Guillermo, [facility]       ______________________________________________________________________                                              Individual Treatment Plan    Patient's Name: Cruzito GOMEZ Case  YOB: 2000    Date of Creation: 1/24/2024  Date Treatment Plan Last Reviewed/Revised: 3/12/2025    DSM5 Diagnoses: 296.22 (F32.1)  Major Depressive Disorder, Single Episode, Moderate With anxious distress  Psychosocial / Contextual Factors: living with a roommate, works full-time, single  PROMIS (reviewed every 90 days): PROMIS  10-Global Health (only subscores and total score):       8/16/2022     2:42 PM 12/18/2023     2:35 PM 4/3/2024     2:50 PM 9/18/2024     2:59 PM 2/5/2025     2:55 PM   PROMIS-10 Scores Only   Global Mental Health Score 11 8 13 11 9    Global Physical Health Score 17 16 16 17 13    PROMIS TOTAL - SUBSCORES 28 24 29 28 22        Patient-reported        Referral / Collaboration:  Referral to another professional/service is not indicated at this time..    Anticipated number of session for this episode of care: 9-12 sessions  Anticipation frequency of session: Monthly  Anticipated Duration of each session: 38-52 minutes  Treatment plan will be reviewed in 90 days or when goals have been changed.       MeasurableTreatment Goal(s) related to diagnosis / functional impairment(s)  Goal 1: Patient will effectively reduce depressive symptoms as evidenced by improved PHQ9 score and increased engagement of activities.    I will know I've met my goal when I can be more open-minded about things.      Objective #A (Patient Action)    Patient will Identify negative self-talk and behaviors: challenge core beliefs, myths, and actions.   Status: Continued - Date(s): 3/12/2025       Intervention(s)  [facility] will provide psycho-education on cognitive distortions and ways to challenge unhelpful thoughts and use positive affirmations. Help patient create a list of activities to do and identify barriers.     Objective #B  Patient will identify activities to engage in and start to do different things.  Status: New - Date: 3/12/2025     Intervention(s)  [facility] will help patient identify activities to do and social events to help him create more connection and improve mood.    Objective #C (Patient Action)    Patient will identify 1-2 sleep hygiene practices and patient is not waking up in the middle of the night.  Status: Continued - Date(s): 3/12/2025    Intervention(s)  [facility] will process sleep habits, provide psycho-education on sleep hygiene and  ways to incorporate habits.      Patient has reviewed and agreed to the above plan.    Written by  VICKY Guillermo, Behavioral Health Clinician

## 2025-07-02 ENCOUNTER — OFFICE VISIT (OUTPATIENT)
Dept: BEHAVIORAL HEALTH | Facility: CLINIC | Age: 25
End: 2025-07-02
Payer: COMMERCIAL

## 2025-07-02 DIAGNOSIS — F32.1 MAJOR DEPRESSIVE DISORDER, SINGLE EPISODE, MODERATE (H): Primary | ICD-10-CM

## 2025-07-02 PROCEDURE — 90834 PSYTX W PT 45 MINUTES: CPT | Performed by: MARRIAGE & FAMILY THERAPIST

## 2025-07-02 ASSESSMENT — PATIENT HEALTH QUESTIONNAIRE - PHQ9
SUM OF ALL RESPONSES TO PHQ QUESTIONS 1-9: 8
SUM OF ALL RESPONSES TO PHQ QUESTIONS 1-9: 8
10. IF YOU CHECKED OFF ANY PROBLEMS, HOW DIFFICULT HAVE THESE PROBLEMS MADE IT FOR YOU TO DO YOUR WORK, TAKE CARE OF THINGS AT HOME, OR GET ALONG WITH OTHER PEOPLE: SOMEWHAT DIFFICULT

## 2025-07-02 NOTE — PROGRESS NOTES
[facility] Primary Care: Integrated Behavioral Health    Behavioral Health Clinician Progress Note   Mental Health & Addiction Services      Wednesday, July 2, 2025    Patient Name: Cruzito GOMEZ Case       Service Type:  Individual   Service Location:  Face to Face in Clinic   Visit Start Time: 3:14 PM  Visit End Time:  4:05 pm   Session Length: 38 - 52    Attendees: Patient   Service Modality: In-person   Visit number: 16    [facility] Visit Activities (Refresh list every visit): [facility] Only     [place] Diagnostic Assessment: 1/3/2024  Treatment Plan Review Date: 7/2/2025    DATA:    Extended Session (60+ minutes): No   Interactive Complexity: No   Crisis: No   [facility] Patient: No     Assessments completed prior to visit:   PHQ9:       4/3/2024     2:49 PM 9/18/2024     2:58 PM 12/3/2024     8:08 AM 12/11/2024     2:43 PM 2/5/2025     2:54 PM 3/12/2025     2:51 PM 7/2/2025     3:01 PM   PHQ-9 SCORE   PHQ-9 Total Score MyChart 8 (Mild depression) 7 (Mild depression) 7 (Mild depression) 5 (Mild depression) 9 (Mild depression) 8 (Mild depression) 8 (Mild depression)   PHQ-9 Total Score 8 7 7  5  9  8  8        Patient-reported     GAD7:       8/16/2022     2:32 PM 12/18/2023     2:34 PM   SHAHEED-7 SCORE   Total Score 6 (mild anxiety) 9 (mild anxiety)   Total Score 6 9     CAGE-AID:       8/16/2022     2:43 PM   CAGE-AID Total Score   Total Score 0   Total Score MyChart 0 (A total score of 2 or greater is considered clinically significant)     PROMIS 10-Global Health (all questions and answers displayed):       8/16/2022     2:42 PM 12/18/2023     2:35 PM 4/3/2024     2:50 PM 9/18/2024     2:59 PM 2/5/2025     2:55 PM 7/2/2025     3:02 PM   PROMIS 10   In general, would you say your health is: Excellent Excellent Very good Very good Good Good   In general, would you say your quality of life is: Excellent Very good Very good Very good Very good Very good   In general, how would you rate your physical  health? Excellent Excellent Very good Very good Good Good   In general, how would you rate your mental health, including your mood and your ability to think? Fair Fair Good Fair Poor Fair   In general, how would you rate your satisfaction with your social activities and relationships? Fair Poor Fair Fair Good Good   In general, please rate how well you carry out your usual social activities and roles Excellent Very good Very good Good Good Good   To what extent are you able to carry out your everyday physical activities such as walking, climbing stairs, carrying groceries, or moving a chair? Completely Completely Completely Completely Completely Completely   In the past 7 days, how often have you been bothered by emotional problems such as feeling anxious, depressed, or irritable? Often Always Rarely Sometimes Always Often   In the past 7 days, how would you rate your fatigue on average? Mild Moderate Moderate Mild Moderate Moderate   In the past 7 days, how would you rate your pain on average, where 0 means no pain, and 10 means worst imaginable pain? 6 5 1 3 7 6   In general, would you say your health is: 5 5 4 4 3 3   In general, would you say your quality of life is: 5 4 4 4 4 4   In general, how would you rate your physical health? 5 5 4 4 3 3   In general, how would you rate your mental health, including your mood and your ability to think? 2 2 3 2 1 2   In general, how would you rate your satisfaction with your social activities and relationships? 2 1 2 2 3 3   In general, please rate how well you carry out your usual social activities and roles. (This includes activities at home, at work and in your community, and responsibilities as a parent, child, spouse, employee, friend, etc.) 5 4 4 3 3 3   To what extent are you able to carry out your everyday physical activities such as walking, climbing stairs, carrying groceries, or moving a chair? 5 5 5 5 5 5   In the past 7 days, how often have you been bothered  "by emotional problems such as feeling anxious, depressed, or irritable? 4 5 2 3 5 4   In the past 7 days, how would you rate your fatigue on average? 2 3 3 2 3 3   In the past 7 days, how would you rate your pain on average, where 0 means no pain, and 10 means worst imaginable pain? 6 5 1 3 7 6   Global Mental Health Score 11 8 13 11 9  11    Global Physical Health Score 17 16 16 17 13  14    PROMIS TOTAL - SUBSCORES 28 24 29 28 22  25        Patient-reported     [place] Suicide Severity Rating Scale (Lifetime/Recent)      8/16/2022     3:27 PM 12/18/2023     5:34 PM   [place] Suicide Severity Rating (Lifetime/Recent)   1. Wish to be Dead (Lifetime) Y Y   Wish to be Dead Description (Lifetime) He states that starting about 3 years ago he wished he could \"pop out of existence\". He states that this would mostly occur at work and it would be to avoid what is going on. Starting about 5 years ago, he states that he experienced thoughts of wishing to \"pop out of existence\".   1. Wish to be Dead (Past 1 Month) Y N   Wish to be Dead Description (Past 1 Month) Patient identified thoughts of wishing he would \"pop out of existence\"    2. Non-Specific Active Suicidal Thoughts (Lifetime) N N   Most Severe Ideation Rating (Lifetime) 1 2   Most Severe Ideation Rating (Past 1 Month) 1 --   Frequency (Lifetime) 2 1   Frequency (Past 1 Month) 1 --   Duration (Lifetime) 1 1   Duration (Past 1 Month) 1 --   Controllability (Lifetime) 1 1   Controllability (Past 1 Month) 1 --   Deterrents (Lifetime) 1 1   Deterrents (Past 1 Month) 1 --   Reasons for Ideation (Lifetime) 4 4   Reasons for Ideation (Past 1 Month) 4 0   Actual Attempt (Lifetime) N N   Has subject engaged in non-suicidal self-injurious behavior? (Lifetime) N N   Interrupted Attempts (Lifetime) N N   Aborted or Self-Interrupted Attempt (Lifetime) N N   Preparatory Acts or Behavior (Lifetime) N N   Calculated C-SSRS Risk Score (Lifetime/Recent) Low Risk No Risk Indicated " "      Reason for Visit/Presenting Concern:  Depression    Current Stressors / Issues:   Patient reports being in more of a \"downer mood\" over the last couple of weeks. Patient identified that political issues contribute to this as well as his weight and reflected that he scrolls on his phone. He states that he also finds himself reminiscing about his youth and high school years.     Patient became tearful and talked about wanting to change and that this is also hard for him. He processed seeing his younger siblings being at different stages in their lives and how this weighs on him.    Therapeutic Interventions:  Motivational Interviewing (MI): Validated patient's thoughts, feelings and experience. Asked open-ended questions to invite patient's self-reflection and self-direction around change and what is important for them in working towards their goals.  Affirmed patient's strengths and abilities.  Psycho-education: Provided psycho-education about patient's behavioral health condition and symptoms.      Response to treatment interventions:   Patient was receptive to interventions utilized.  Patient was engaged in the therapy process.       Progress on Treatment Objective(s) / Homework:   Worsening - ACTION (Actively working towards change); Intervened by reinforcing change plan / affirming steps taken     Medication Review:   No current psychiatric medications prescribed     Medication Compliance:   NA     Chemical Use Review:  Substance Use: Chemical use reviewed, no active concerns identified      Tobacco Use: No current tobacco use.       Assessment: Current Emotional / Mental Status (status of significant symptoms):    Risk status (Self / Other harm or suicidal ideation)   Patient has had a history of suicidal ideation: 5 years ago had thoughts of wanting to \"pop out of existence.\" He denied any specific thoughts of wanting to die or kill himself and denies a history of suicide attempts, self-injurious " behavior, homicidal ideation, homicidal behavior, and and other safety concerns   Patient denies current fears or concerns for personal safety.   Patient denies current or recent suicidal ideation or behaviors.   Patient denies current or recent homicidal ideation or behaviors.   Patient denies current or recent self injurious behavior or ideation.   Patient denies other safety concerns.   A formal safety and risk management plan has been developed including: patient      ASSESSMENT:   Mental Status:     Appearance:   Appropriate    Eye Contact:   Fair    Psychomotor Behavior: Normal    Attitude:   Cooperative  Pleasant   Orientation:   All   Speech Rate / Production: Normal    Volume:   Normal    Mood:    Depressed    Affect:    Appropriate  Tearful   Thought Content:  Perseverative Rumination    Thought Form:  Coherent  Logical    Insight:    Fair          Diagnoses:   Major depressive disorder, single episode, moderate (H)    Collateral Reports Completed:   Not Applicable       Plan: (Homework, other):   Patient was provided CD Recommendations: No indications of CD issues  Patient was given information about behavioral services and encouraged to schedule a follow up appointment with the clinic [facility] in 1 month.    Patient will engage in physical activity regularly. He will consider ways to start to make change with his diet as well as his occupation.    VICKY Guillermo, [facility]       ______________________________________________________________________                                              Individual Treatment Plan    Patient's Name: Cruzito GOMEZ Case  YOB: 2000    Date of Creation: 1/24/2024  Date Treatment Plan Last Reviewed/Revised: 7/2/2025    DSM5 Diagnoses: 296.22 (F32.1)  Major Depressive Disorder, Single Episode, Moderate With anxious distress  Psychosocial / Contextual Factors: living with a roommate, works full-time, single  PROMIS (reviewed every 90 days): PROMIS 10-Global Health  (only subscores and total score):       8/16/2022     2:42 PM 12/18/2023     2:35 PM 4/3/2024     2:50 PM 9/18/2024     2:59 PM 2/5/2025     2:55 PM 7/2/2025     3:02 PM   PROMIS-10 Scores Only   Global Mental Health Score 11 8 13 11 9  11    Global Physical Health Score 17 16 16 17 13  14    PROMIS TOTAL - SUBSCORES 28 24 29 28 22  25        Patient-reported        Referral / Collaboration:  Referral to another professional/service is not indicated at this time..    Anticipated number of session for this episode of care: 9-12 sessions  Anticipation frequency of session: Monthly  Anticipated Duration of each session: 38-52 minutes  Treatment plan will be reviewed in 90 days or when goals have been changed.       MeasurableTreatment Goal(s) related to diagnosis / functional impairment(s)  Goal 1: Patient will effectively reduce depressive symptoms as evidenced by improved PHQ9 score and increased engagement of activities.    I will know I've met my goal when I can be more open-minded about things.      Objective #A (Patient Action)    Patient will Identify negative self-talk and behaviors: challenge core beliefs, myths, and actions.   Status: Continued - Date(s): 7/2/2025       Intervention(s)  [facility] will provide psycho-education on cognitive distortions and ways to challenge unhelpful thoughts and use positive affirmations. Help patient create a list of activities to do and identify barriers.     Objective #B  Patient will identify activities to engage in and start to do different things.  Status: Continued - Date(s):  7/2/2025     Intervention(s)  [facility] will help patient identify activities to do and social events to help him create more connection and improve mood.    Objective #C (Patient Action)    Patient will identify 1-2 sleep hygiene practices and patient is not waking up in the middle of the night.  Status: Continued - Date(s): 7/2/2025      Intervention(s)  [facility] will process sleep habits, provide  psycho-education on sleep hygiene and ways to incorporate habits.      Patient has reviewed and agreed to the above plan.    Written by  VICKY Guillermo, Behavioral Health Clinician

## (undated) DEVICE — ESU GROUND PAD UNIVERSAL W/O CORD

## (undated) DEVICE — ESU PENCIL SMOKE EVAC W/ROCKER SWITCH 0703-047-000

## (undated) DEVICE — TAPE DURAPORE 3" SILK 1538-3

## (undated) DEVICE — SUCTION MANIFOLD NEPTUNE 2 SYS 4 PORT 0702-020-000

## (undated) DEVICE — PACK MINOR PROCEDURE CUSTOM

## (undated) DEVICE — SOL ADH LIQUID BENZOIN SWAB 0.6ML C1544

## (undated) DEVICE — GLOVE PROTEXIS W/NEU-THERA 7.5  2D73TE75

## (undated) DEVICE — SU VICRYL 3-0 SH 27" UND J416H

## (undated) DEVICE — SU ETHILON 3-0 PS-2 18" 1669H

## (undated) DEVICE — DRAPE LAP W/ARMBOARD 29410

## (undated) DEVICE — PREP SKIN SCRUB TRAY 4461A

## (undated) DEVICE — DRSG ABDOMINAL 07 1/2X8" 7197D

## (undated) RX ORDER — DEXAMETHASONE SODIUM PHOSPHATE 10 MG/ML
INJECTION, SOLUTION INTRAMUSCULAR; INTRAVENOUS
Status: DISPENSED
Start: 2021-10-06

## (undated) RX ORDER — LIDOCAINE HYDROCHLORIDE 20 MG/ML
INJECTION, SOLUTION EPIDURAL; INFILTRATION; INTRACAUDAL; PERINEURAL
Status: DISPENSED
Start: 2021-10-06

## (undated) RX ORDER — GINSENG 100 MG
CAPSULE ORAL
Status: DISPENSED
Start: 2021-10-06

## (undated) RX ORDER — FENTANYL CITRATE 50 UG/ML
INJECTION, SOLUTION INTRAMUSCULAR; INTRAVENOUS
Status: DISPENSED
Start: 2021-10-06

## (undated) RX ORDER — EPHEDRINE SULFATE 50 MG/ML
INJECTION, SOLUTION INTRAMUSCULAR; INTRAVENOUS; SUBCUTANEOUS
Status: DISPENSED
Start: 2021-10-06

## (undated) RX ORDER — ONDANSETRON 2 MG/ML
INJECTION INTRAMUSCULAR; INTRAVENOUS
Status: DISPENSED
Start: 2021-10-06

## (undated) RX ORDER — PROPOFOL 10 MG/ML
INJECTION, EMULSION INTRAVENOUS
Status: DISPENSED
Start: 2021-10-06

## (undated) RX ORDER — KETOROLAC TROMETHAMINE 30 MG/ML
INJECTION, SOLUTION INTRAMUSCULAR; INTRAVENOUS
Status: DISPENSED
Start: 2021-10-06

## (undated) RX ORDER — HYDROMORPHONE HYDROCHLORIDE 1 MG/ML
INJECTION, SOLUTION INTRAMUSCULAR; INTRAVENOUS; SUBCUTANEOUS
Status: DISPENSED
Start: 2021-10-06

## (undated) RX ORDER — BUPIVACAINE HYDROCHLORIDE AND EPINEPHRINE 2.5; 5 MG/ML; UG/ML
INJECTION, SOLUTION EPIDURAL; INFILTRATION; INTRACAUDAL; PERINEURAL
Status: DISPENSED
Start: 2021-10-06

## (undated) RX ORDER — FENTANYL CITRATE-0.9 % NACL/PF 10 MCG/ML
PLASTIC BAG, INJECTION (ML) INTRAVENOUS
Status: DISPENSED
Start: 2021-10-06